# Patient Record
Sex: MALE | Race: WHITE | NOT HISPANIC OR LATINO | Employment: UNEMPLOYED | URBAN - METROPOLITAN AREA
[De-identification: names, ages, dates, MRNs, and addresses within clinical notes are randomized per-mention and may not be internally consistent; named-entity substitution may affect disease eponyms.]

---

## 2017-01-19 ENCOUNTER — TRANSCRIBE ORDERS (OUTPATIENT)
Dept: ADMINISTRATIVE | Facility: HOSPITAL | Age: 57
End: 2017-01-19

## 2017-01-19 DIAGNOSIS — C67.2 MALIGNANT NEOPLASM OF LATERAL WALL OF URINARY BLADDER (HCC): Primary | ICD-10-CM

## 2017-01-20 ENCOUNTER — APPOINTMENT (OUTPATIENT)
Dept: LAB | Facility: CLINIC | Age: 57
End: 2017-01-20
Payer: COMMERCIAL

## 2017-01-20 ENCOUNTER — TRANSCRIBE ORDERS (OUTPATIENT)
Dept: ADMINISTRATIVE | Facility: HOSPITAL | Age: 57
End: 2017-01-20

## 2017-01-20 DIAGNOSIS — C67.2 MALIGNANT NEOPLASM OF LATERAL WALL OF URINARY BLADDER (HCC): ICD-10-CM

## 2017-01-20 DIAGNOSIS — C67.2 MALIGNANT NEOPLASM OF LATERAL WALL OF URINARY BLADDER (HCC): Primary | ICD-10-CM

## 2017-01-20 LAB
ALBUMIN SERPL BCP-MCNC: 4.2 G/DL (ref 3.5–5)
ALP SERPL-CCNC: 100 U/L (ref 46–116)
ALT SERPL W P-5'-P-CCNC: 26 U/L (ref 12–78)
ANION GAP SERPL CALCULATED.3IONS-SCNC: 6 MMOL/L (ref 4–13)
AST SERPL W P-5'-P-CCNC: 15 U/L (ref 5–45)
BILIRUB SERPL-MCNC: 0.52 MG/DL (ref 0.2–1)
BUN SERPL-MCNC: 10 MG/DL (ref 5–25)
CALCIUM SERPL-MCNC: 8.7 MG/DL (ref 8.3–10.1)
CHLORIDE SERPL-SCNC: 100 MMOL/L (ref 100–108)
CO2 SERPL-SCNC: 29 MMOL/L (ref 21–32)
CREAT SERPL-MCNC: 1.01 MG/DL (ref 0.6–1.3)
ERYTHROCYTE [DISTWIDTH] IN BLOOD BY AUTOMATED COUNT: 13.3 % (ref 11.6–15.1)
GFR SERPL CREATININE-BSD FRML MDRD: >60 ML/MIN/1.73SQ M
GLUCOSE SERPL-MCNC: 117 MG/DL (ref 65–140)
HCT VFR BLD AUTO: 41.2 % (ref 36.5–49.3)
HGB BLD-MCNC: 14.5 G/DL (ref 12–17)
MCH RBC QN AUTO: 32.1 PG (ref 26.8–34.3)
MCHC RBC AUTO-ENTMCNC: 35.2 G/DL (ref 31.4–37.4)
MCV RBC AUTO: 91 FL (ref 82–98)
PLATELET # BLD AUTO: 176 THOUSANDS/UL (ref 149–390)
PMV BLD AUTO: 8.9 FL (ref 8.9–12.7)
POTASSIUM SERPL-SCNC: 3.6 MMOL/L (ref 3.5–5.3)
PROT SERPL-MCNC: 7.9 G/DL (ref 6.4–8.2)
RBC # BLD AUTO: 4.52 MILLION/UL (ref 3.88–5.62)
SODIUM SERPL-SCNC: 135 MMOL/L (ref 136–145)
WBC # BLD AUTO: 6.77 THOUSAND/UL (ref 4.31–10.16)

## 2017-01-20 PROCEDURE — 80053 COMPREHEN METABOLIC PANEL: CPT

## 2017-01-20 PROCEDURE — 85027 COMPLETE CBC AUTOMATED: CPT

## 2017-01-20 PROCEDURE — 36415 COLL VENOUS BLD VENIPUNCTURE: CPT

## 2017-01-25 ENCOUNTER — HOSPITAL ENCOUNTER (OUTPATIENT)
Dept: CT IMAGING | Facility: HOSPITAL | Age: 57
Discharge: HOME | End: 2017-01-25
Payer: COMMERCIAL

## 2017-01-25 DIAGNOSIS — C67.2 MALIGNANT NEOPLASM OF LATERAL WALL OF URINARY BLADDER (HCC): ICD-10-CM

## 2017-01-25 PROCEDURE — 74178 CT ABD&PLV WO CNTR FLWD CNTR: CPT

## 2017-01-25 RX ADMIN — IOHEXOL 100 ML: 350 INJECTION, SOLUTION INTRAVENOUS at 07:34

## 2017-07-20 ENCOUNTER — GENERIC CONVERSION - ENCOUNTER (OUTPATIENT)
Dept: OTHER | Facility: OTHER | Age: 57
End: 2017-07-20

## 2017-07-20 LAB
BILIRUB UR QL STRIP: NEGATIVE
CLARITY UR: NORMAL
COLOR UR: YELLOW
GLUCOSE (HISTORICAL): NEGATIVE
HGB UR QL STRIP.AUTO: NORMAL
KETONES UR STRIP-MCNC: NEGATIVE MG/DL
LEUKOCYTE ESTERASE UR QL STRIP: NORMAL
NITRITE UR QL STRIP: NEGATIVE
PH UR STRIP.AUTO: 7 [PH]
PROT UR STRIP-MCNC: NEGATIVE MG/DL
SP GR UR STRIP.AUTO: 1.01
UROBILINOGEN UR QL STRIP.AUTO: 0.2

## 2018-01-12 VITALS
SYSTOLIC BLOOD PRESSURE: 164 MMHG | BODY MASS INDEX: 28.1 KG/M2 | WEIGHT: 212 LBS | DIASTOLIC BLOOD PRESSURE: 90 MMHG | HEIGHT: 73 IN

## 2018-03-06 ENCOUNTER — TRANSCRIBE ORDERS (OUTPATIENT)
Dept: LAB | Facility: CLINIC | Age: 58
End: 2018-03-06

## 2018-03-06 ENCOUNTER — APPOINTMENT (OUTPATIENT)
Dept: LAB | Facility: CLINIC | Age: 58
End: 2018-03-06
Payer: COMMERCIAL

## 2018-03-06 DIAGNOSIS — M19.90 SENILE ARTHRITIS: Primary | ICD-10-CM

## 2018-03-06 DIAGNOSIS — M19.90 SENILE ARTHRITIS: ICD-10-CM

## 2018-03-06 LAB
CRP SERPL QL: 3.2 MG/L
ERYTHROCYTE [SEDIMENTATION RATE] IN BLOOD: 14 MM/HOUR (ref 0–10)

## 2018-03-06 PROCEDURE — 86038 ANTINUCLEAR ANTIBODIES: CPT

## 2018-03-06 PROCEDURE — 86430 RHEUMATOID FACTOR TEST QUAL: CPT

## 2018-03-06 PROCEDURE — 86140 C-REACTIVE PROTEIN: CPT

## 2018-03-06 PROCEDURE — 36415 COLL VENOUS BLD VENIPUNCTURE: CPT

## 2018-03-06 PROCEDURE — 84165 PROTEIN E-PHORESIS SERUM: CPT

## 2018-03-06 PROCEDURE — 86617 LYME DISEASE ANTIBODY: CPT

## 2018-03-06 PROCEDURE — 85652 RBC SED RATE AUTOMATED: CPT

## 2018-03-06 PROCEDURE — 86200 CCP ANTIBODY: CPT

## 2018-03-07 LAB
B BURGDOR IGG PATRN SER IB-IMP: NEGATIVE
B BURGDOR IGM PATRN SER IB-IMP: NEGATIVE
B BURGDOR18KD IGG SER QL IB: ABNORMAL
B BURGDOR23KD IGG SER QL IB: ABNORMAL
B BURGDOR23KD IGM SER QL IB: ABNORMAL
B BURGDOR28KD IGG SER QL IB: ABNORMAL
B BURGDOR30KD IGG SER QL IB: PRESENT
B BURGDOR39KD IGG SER QL IB: ABNORMAL
B BURGDOR39KD IGM SER QL IB: ABNORMAL
B BURGDOR41KD IGG SER QL IB: ABNORMAL
B BURGDOR41KD IGM SER QL IB: ABNORMAL
B BURGDOR45KD IGG SER QL IB: ABNORMAL
B BURGDOR58KD IGG SER QL IB: ABNORMAL
B BURGDOR66KD IGG SER QL IB: ABNORMAL
B BURGDOR93KD IGG SER QL IB: ABNORMAL
RHEUMATOID FACT SER QL LA: NEGATIVE
RYE IGE QN: NEGATIVE

## 2018-03-08 LAB
ALBUMIN SERPL ELPH-MCNC: 4.7 G/DL (ref 3.5–5)
ALBUMIN SERPL ELPH-MCNC: 61.1 % (ref 52–65)
ALPHA1 GLOB SERPL ELPH-MCNC: 0.36 G/DL (ref 0.1–0.4)
ALPHA1 GLOB SERPL ELPH-MCNC: 4.7 % (ref 2.5–5)
ALPHA2 GLOB SERPL ELPH-MCNC: 0.76 G/DL (ref 0.4–1.2)
ALPHA2 GLOB SERPL ELPH-MCNC: 9.9 % (ref 7–13)
BETA GLOB ABNORMAL SERPL ELPH-MCNC: 0.45 G/DL (ref 0.4–0.8)
BETA1 GLOB SERPL ELPH-MCNC: 5.8 % (ref 5–13)
BETA2 GLOB SERPL ELPH-MCNC: 6.4 % (ref 2–8)
BETA2+GAMMA GLOB SERPL ELPH-MCNC: 0.49 G/DL (ref 0.2–0.5)
GAMMA GLOB ABNORMAL SERPL ELPH-MCNC: 0.93 G/DL (ref 0.5–1.6)
GAMMA GLOB SERPL ELPH-MCNC: 12.1 % (ref 12–22)
IGG/ALB SER: 1.57 {RATIO} (ref 1.1–1.8)
PROT PATTERN SERPL ELPH-IMP: NORMAL
PROT SERPL-MCNC: 7.7 G/DL (ref 6.4–8.2)

## 2018-03-09 LAB — CCP IGA+IGG SERPL IA-ACNC: 5 UNITS (ref 0–19)

## 2018-07-19 RX ORDER — AMLODIPINE BESYLATE 10 MG/1
1 TABLET ORAL
COMMUNITY

## 2018-07-20 ENCOUNTER — PROCEDURE VISIT (OUTPATIENT)
Dept: UROLOGY | Facility: MEDICAL CENTER | Age: 58
End: 2018-07-20
Payer: COMMERCIAL

## 2018-07-20 VITALS
SYSTOLIC BLOOD PRESSURE: 130 MMHG | HEIGHT: 74 IN | WEIGHT: 220 LBS | BODY MASS INDEX: 28.23 KG/M2 | DIASTOLIC BLOOD PRESSURE: 82 MMHG

## 2018-07-20 DIAGNOSIS — Z12.5 ENCOUNTER FOR PROSTATE CANCER SCREENING: ICD-10-CM

## 2018-07-20 DIAGNOSIS — C67.2 MALIGNANT NEOPLASM OF LATERAL WALL OF URINARY BLADDER (HCC): Primary | ICD-10-CM

## 2018-07-20 LAB
SL AMB  POCT GLUCOSE, UA: ABNORMAL
SL AMB LEUKOCYTE ESTERASE,UA: ABNORMAL
SL AMB POCT BILIRUBIN,UA: ABNORMAL
SL AMB POCT BLOOD,UA: ABNORMAL
SL AMB POCT CLARITY,UA: CLEAR
SL AMB POCT COLOR,UA: YELLOW
SL AMB POCT KETONES,UA: ABNORMAL
SL AMB POCT NITRITE,UA: ABNORMAL
SL AMB POCT PH,UA: 6.5
SL AMB POCT SPECIFIC GRAVITY,UA: 1.01
SL AMB POCT URINE PROTEIN: ABNORMAL
SL AMB POCT UROBILINOGEN: 0.2

## 2018-07-20 PROCEDURE — 81003 URINALYSIS AUTO W/O SCOPE: CPT | Performed by: UROLOGY

## 2018-07-20 PROCEDURE — 99212 OFFICE O/P EST SF 10 MIN: CPT | Performed by: UROLOGY

## 2018-07-20 RX ORDER — LISINOPRIL 10 MG/1
10 TABLET ORAL DAILY
COMMUNITY

## 2018-07-20 RX ORDER — CIPROFLOXACIN 500 MG/1
500 TABLET, FILM COATED ORAL ONCE
Qty: 1 TABLET | Refills: 0 | Status: SHIPPED | OUTPATIENT
Start: 2018-07-20 | End: 2018-07-20

## 2018-07-20 NOTE — PROGRESS NOTES
[address] [facility]  [facility]  [address] [facility], [address] [place] [address]  [phone]  www  [facility]  org      NAME: Odin Carter  AGE: 59 y o  SEX: male  : 1960   MRN: [id number]    DATE: 2018  TIME: 9:59 AM    Assessment and Plan: Three of status post bladder cancer-N ED  Follow-up 1 year with cystoscopy  Encounter for prostate cancer screening:  Check PSA, send results to patient  One pill of Cipro called the patient's pharmacy for to cover the procedure  Chief Complaint     Chief Complaint   Patient presents with    Cystoscopy       History of Present Illness     TURBT 2015, low grade papillary TCC, followed by BCG  Had 5cm initial tumor, which was reason for adjuvant tx  Here for surveillance cysto  Doing well, no complaints  Cystoscopy Procedure Note        Pre-operative Diagnosis:  Bladder cancer    Post-operative Diagnosis:  Bladder cancer no recurrence    Procedure: Flexible cystoscopy    Surgeon: Ginny Luna MD    Anesthesia: 1% Xylocaine per urethra    EBL: Minimal    Complications: none    Procedure Details   The risks, benefits, complications, treatment options, and expected outcomes were discussed with the patient  The patient concurred with the proposed plan, giving informed consent  Cystoscopy was performed today under local anesthesia, using sterile technique  The patient was placed in the supine position, prepped with Betadine, and draped in the usual sterile fashion  The flexible cystocope was used to inspect both the urethra and bladder    Findings:  Urethra:  Normal without stricture  Minimal prostatic obstruction    Bladder:  Smooth, not trabeculated and there were no stones tumors or other lesions  The orifices were orthotopic and intact  Stellate scar in the left lateral trigone, no recurrence             Specimens:  None                 Complications:  None           Disposition: To home Condition:  Stable        The following portions of the patient's history were reviewed and updated as appropriate: allergies, current medications, past family history, past medical history, past social history, past surgical history and problem list     Review of Systems   Review of Systems    Active Problem List   There is no problem list on file for this patient        Objective   /82   Ht 6' 2" (1 88 m)   Wt 99 8 kg (220 lb)   BMI 28 25 kg/m²     Physical Exam        Current Medications     Current Outpatient Prescriptions:     amLODIPine (NORVASC) 10 mg tablet, Take 1 tablet by mouth, Disp: , Rfl:     lisinopril (ZESTRIL) 10 mg tablet, Take 10 mg by mouth daily, Disp: , Rfl:         Donald Camp MD

## 2018-07-20 NOTE — LETTER
2018     Dominique Bettencourt DO  [address] [facility] [address]    Patient: Truman Gant   YOB: 1960   Date of Visit: 2018       Dear Dr Vic Sullivan:    Thank you for referring Truman Gant to me for evaluation  Below are my notes for this consultation  If you have questions, please do not hesitate to call me  I look forward to following your patient along with you  Sincerely,        Leif Pierce MD        CC: No Recipients  Leif Pierce MD  2018 10:15 AM  Sign at close encounter  [address] [facility]  [address], [address] [place] [address]  [phone]  www  [facility]  org      NAME: Truman Gant  AGE: 59 y o  SEX: male  : 1960   MRN: [id number]    DATE: 2018  TIME: 9:59 AM    Assessment and Plan: Three of status post bladder cancer-N ED  Follow-up 1 year with cystoscopy  Encounter for prostate cancer screening:  Check PSA, send results to patient  One pill of Cipro called the patient's pharmacy for to cover the procedure  Chief Complaint     Chief Complaint   Patient presents with    Cystoscopy       History of Present Illness     TURBT 2015, low grade papillary TCC, followed by BCG  Had 5cm initial tumor, which was reason for adjuvant tx  Here for surveillance cysto  Doing well, no complaints  Cystoscopy Procedure Note        Pre-operative Diagnosis:  Bladder cancer    Post-operative Diagnosis:  Bladder cancer no recurrence    Procedure: Flexible cystoscopy    Surgeon: Narinder Lester MD    Anesthesia: 1% Xylocaine per urethra    EBL: Minimal    Complications: none    Procedure Details   The risks, benefits, complications, treatment options, and expected outcomes were discussed with the patient  The patient concurred with the proposed plan, giving informed consent  Cystoscopy was performed today under local anesthesia, using sterile technique   The patient was placed in the supine position, prepped with Betadine, and draped in the usual sterile fashion  The flexible cystocope was used to inspect both the urethra and bladder    Findings:  Urethra:  Normal without stricture  Minimal prostatic obstruction    Bladder:  Smooth, not trabeculated and there were no stones tumors or other lesions  The orifices were orthotopic and intact  Stellate scar in the left lateral trigone, no recurrence  Specimens:  None                 Complications:  None           Disposition: To home            Condition:  Stable        The following portions of the patient's history were reviewed and updated as appropriate: allergies, current medications, past family history, past medical history, past social history, past surgical history and problem list     Review of Systems   Review of Systems    Active Problem List   There is no problem list on file for this patient        Objective   /82   Ht 6' 2" (1 88 m)   Wt 99 8 kg (220 lb)   BMI 28 25 kg/m²      Physical Exam        Current Medications     Current Outpatient Prescriptions:     amLODIPine (NORVASC) 10 mg tablet, Take 1 tablet by mouth, Disp: , Rfl:     lisinopril (ZESTRIL) 10 mg tablet, Take 10 mg by mouth daily, Disp: , Rfl:         Tom Mendoza MD

## 2018-07-24 ENCOUNTER — LAB (OUTPATIENT)
Dept: LAB | Facility: CLINIC | Age: 58
End: 2018-07-24
Payer: COMMERCIAL

## 2018-07-24 DIAGNOSIS — Z12.5 ENCOUNTER FOR PROSTATE CANCER SCREENING: ICD-10-CM

## 2018-07-24 LAB — PSA SERPL-MCNC: 0.6 NG/ML (ref 0–4)

## 2018-07-24 PROCEDURE — 36415 COLL VENOUS BLD VENIPUNCTURE: CPT

## 2018-07-24 PROCEDURE — G0103 PSA SCREENING: HCPCS

## 2018-08-30 ENCOUNTER — OFFICE VISIT (OUTPATIENT)
Dept: URGENT CARE | Facility: CLINIC | Age: 58
End: 2018-08-30
Payer: COMMERCIAL

## 2018-08-30 VITALS
HEIGHT: 74 IN | WEIGHT: 225 LBS | RESPIRATION RATE: 20 BRPM | DIASTOLIC BLOOD PRESSURE: 90 MMHG | TEMPERATURE: 98.3 F | OXYGEN SATURATION: 98 % | SYSTOLIC BLOOD PRESSURE: 152 MMHG | HEART RATE: 78 BPM | BODY MASS INDEX: 28.88 KG/M2

## 2018-08-30 DIAGNOSIS — H57.12 LEFT EYE PAIN: Primary | ICD-10-CM

## 2018-08-30 DIAGNOSIS — H53.8 BLURRY VISION, LEFT EYE: ICD-10-CM

## 2018-08-30 PROCEDURE — S9088 SERVICES PROVIDED IN URGENT: HCPCS | Performed by: PHYSICIAN ASSISTANT

## 2018-08-30 PROCEDURE — 99213 OFFICE O/P EST LOW 20 MIN: CPT | Performed by: PHYSICIAN ASSISTANT

## 2018-08-30 NOTE — PROGRESS NOTES
[address] [facility]  (office) [phone]  (fax) [phone]        NAME: Maria M Beverly is a 59 y o  male  : 1960    MRN: [id number]  DATE: 2018  TIME: 10:27 AM    Assessment and Plan   Left eye pain [H57 12]  1  Left eye pain  Ambulatory referral to Ophthalmology   2  Blurry vision, left eye         Patient Instructions   Called H[facility] Villafana and Boyd Box and patient is to proceed there now for further evaluation  To present to the ER if symptoms worsen  Chief Complaint     Chief Complaint   Patient presents with    Eye Pain     Woke up this am and has visual disturbances left eye, states it is like looking through "dirty windshield"         History of Present Illness   Maria M Beverly presents to the clinic c/o    Patient reports he woke up this morning and feels like his left eye "is looking out of a dirty windshield"  He reports floaters in bilateral eyes as well  He reports he has a known cataract on his right eye  Has not seen an eye doctor in around 2 years  Tried to get in with his eye doctor today but they do not accept his insurance  Feels healthy otherwise, no other complaints  Eye Pain    The left eye is affected  This is a new problem  The current episode started today  The problem occurs constantly  The problem has been unchanged  There was no injury mechanism  The pain is moderate  There is no known exposure to pink eye  He does not wear contacts  Associated symptoms include blurred vision  Pertinent negatives include no eye discharge, double vision, eye redness, fever, foreign body sensation, itching, nausea, photophobia, recent URI or vomiting  He has tried nothing for the symptoms  The treatment provided no relief  Review of Systems   Review of Systems   Constitutional: Negative for activity change, appetite change, chills, diaphoresis, fatigue and fever     HENT: Negative for congestion, ear discharge, ear pain, facial swelling, rhinorrhea, sinus pain, sinus pressure, sneezing and sore throat  Eyes: Positive for blurred vision, pain and visual disturbance  Negative for double vision, photophobia, discharge, redness and itching  Respiratory: Negative for apnea, cough, chest tightness, shortness of breath and wheezing  Cardiovascular: Negative for chest pain  Gastrointestinal: Negative for abdominal distention, abdominal pain, anal bleeding, blood in stool, constipation, diarrhea, nausea and vomiting  Genitourinary: Negative for dysuria, flank pain, frequency, hematuria and urgency  Musculoskeletal: Negative for arthralgias, back pain, gait problem, joint swelling, myalgias, neck pain and neck stiffness  Skin: Negative for color change, rash and wound  Allergic/Immunologic: Negative for immunocompromised state  Neurological: Negative for dizziness, facial asymmetry and headaches  Hematological: Negative for adenopathy  Psychiatric/Behavioral: Negative for confusion and decreased concentration           Current Medications     Long-Term Prescriptions   Medication Sig Dispense Refill    amLODIPine (NORVASC) 10 mg tablet Take 1 tablet by mouth      lisinopril (ZESTRIL) 10 mg tablet Take 10 mg by mouth daily         Current Allergies     Allergies as of 08/30/2018 - Reviewed 08/30/2018   Allergen Reaction Noted    Penicillins  07/20/2017            The following portions of the patient's history were reviewed and updated as appropriate: allergies, current medications, past family history, past medical history, past social history, past surgical history and problem list   Past Medical History:   Diagnosis Date    Cancer of lateral wall of urinary bladder ([address] )     Cataract, right eye     Edema     Hematuria, gross     Hypertension      Past Surgical History:   Procedure Laterality Date    BLADDER INSTILLATION  2015    BCG  X 6    CYSTOSCOPY  09/30/2015    W/ Irrigation of clots    TRANSURETHRAL RESECTION OF BLADDER TUMOR  09/2015     Social History     Social History    Marital status:      Spouse name: N/A    Number of children: N/A    Years of education: N/A     Occupational History    Not on file  Social History Main Topics    Smoking status: Former Smoker     Packs/day: 2 00    Smokeless tobacco: Not on file    Alcohol use No    Drug use: No    Sexual activity: Not on file     Other Topics Concern    Not on file     Social History Narrative    No narrative on file       Objective   /90   Pulse 78   Temp 98 3 °F (36 8 °C) (Tympanic)   Resp 20   Ht 6' 2" (1 88 m)   Wt 102 kg (225 lb)   SpO2 98%   BMI 28 89 kg/m²      Physical Exam     Physical Exam   Constitutional: He is oriented to person, place, and time  He appears well-developed and well-nourished  No distress  HENT:   Head: Normocephalic and atraumatic  Right Ear: Tympanic membrane and external ear normal    Left Ear: Tympanic membrane and external ear normal    Nose: Nose normal    Mouth/Throat: Oropharynx is clear and moist  No oropharyngeal exudate  Eyes: Conjunctivae and EOM are normal  Pupils are equal, round, and reactive to light  Right eye exhibits no discharge  Left eye exhibits no discharge  Right conjunctiva is not injected  Right conjunctiva has no hemorrhage  Left conjunctiva is not injected  Left conjunctiva has no hemorrhage  No scleral icterus  Right eye exhibits normal extraocular motion and no nystagmus  Left eye exhibits normal extraocular motion and no nystagmus  Pupils are equal    Fundoscopic exam:       The right eye shows no exudate  The left eye shows no exudate  Neck: Normal range of motion  Neck supple  No JVD present  No tracheal deviation present  No thyromegaly present  Cardiovascular: Normal rate, regular rhythm and normal heart sounds  Exam reveals no gallop and no friction rub  No murmur heard  Pulmonary/Chest: Effort normal and breath sounds normal  No stridor   No respiratory distress  He has no wheezes  He has no rales  He exhibits no tenderness  Abdominal: Soft  Bowel sounds are normal  He exhibits no distension and no mass  There is no tenderness  There is no rebound and no guarding  Musculoskeletal: Normal range of motion  He exhibits no tenderness or deformity  Lymphadenopathy:     He has no cervical adenopathy  Neurological: He is alert and oriented to person, place, and time  He has normal reflexes  Coordination normal    Skin: Skin is warm and dry  No rash noted  He is not diaphoretic  No erythema  No pallor  Psychiatric: He has a normal mood and affect  His behavior is normal  Judgment and thought content normal    Nursing note and vitals reviewed        Gal Salgado PA-C

## 2019-07-26 ENCOUNTER — PROCEDURE VISIT (OUTPATIENT)
Dept: UROLOGY | Facility: MEDICAL CENTER | Age: 59
End: 2019-07-26
Payer: COMMERCIAL

## 2019-07-26 ENCOUNTER — APPOINTMENT (OUTPATIENT)
Dept: LAB | Facility: CLINIC | Age: 59
End: 2019-07-26
Payer: COMMERCIAL

## 2019-07-26 VITALS — BODY MASS INDEX: 30.48 KG/M2 | WEIGHT: 230 LBS | HEIGHT: 73 IN

## 2019-07-26 DIAGNOSIS — Z12.5 ENCOUNTER FOR PROSTATE CANCER SCREENING: ICD-10-CM

## 2019-07-26 DIAGNOSIS — C67.2 MALIGNANT NEOPLASM OF LATERAL WALL OF URINARY BLADDER (HCC): Primary | ICD-10-CM

## 2019-07-26 DIAGNOSIS — C67.2 MALIGNANT NEOPLASM OF LATERAL WALL OF URINARY BLADDER (HCC): ICD-10-CM

## 2019-07-26 DIAGNOSIS — C67.4 MALIGNANT NEOPLASM OF POSTERIOR WALL OF URINARY BLADDER (HCC): ICD-10-CM

## 2019-07-26 LAB
ANION GAP SERPL CALCULATED.3IONS-SCNC: 4 MMOL/L (ref 4–13)
BUN SERPL-MCNC: 7 MG/DL (ref 5–25)
CALCIUM SERPL-MCNC: 9.2 MG/DL (ref 8.3–10.1)
CHLORIDE SERPL-SCNC: 103 MMOL/L (ref 100–108)
CO2 SERPL-SCNC: 27 MMOL/L (ref 21–32)
CREAT SERPL-MCNC: 1.19 MG/DL (ref 0.6–1.3)
GFR SERPL CREATININE-BSD FRML MDRD: 66 ML/MIN/1.73SQ M
GLUCOSE P FAST SERPL-MCNC: 109 MG/DL (ref 65–99)
POTASSIUM SERPL-SCNC: 4.2 MMOL/L (ref 3.5–5.3)
PSA SERPL-MCNC: 0.8 NG/ML (ref 0–4)
SL AMB  POCT GLUCOSE, UA: ABNORMAL
SL AMB LEUKOCYTE ESTERASE,UA: ABNORMAL
SL AMB POCT BILIRUBIN,UA: ABNORMAL
SL AMB POCT BLOOD,UA: ABNORMAL
SL AMB POCT CLARITY,UA: CLEAR
SL AMB POCT COLOR,UA: YELLOW
SL AMB POCT KETONES,UA: ABNORMAL
SL AMB POCT NITRITE,UA: ABNORMAL
SL AMB POCT PH,UA: 6.5
SL AMB POCT SPECIFIC GRAVITY,UA: 1.01
SL AMB POCT URINE PROTEIN: ABNORMAL
SL AMB POCT UROBILINOGEN: 0.2
SODIUM SERPL-SCNC: 134 MMOL/L (ref 136–145)

## 2019-07-26 PROCEDURE — 36415 COLL VENOUS BLD VENIPUNCTURE: CPT

## 2019-07-26 PROCEDURE — 80048 BASIC METABOLIC PNL TOTAL CA: CPT

## 2019-07-26 PROCEDURE — G0103 PSA SCREENING: HCPCS

## 2019-07-26 PROCEDURE — 81003 URINALYSIS AUTO W/O SCOPE: CPT | Performed by: UROLOGY

## 2019-07-26 PROCEDURE — 99214 OFFICE O/P EST MOD 30 MIN: CPT | Performed by: UROLOGY

## 2019-07-26 PROCEDURE — 87086 URINE CULTURE/COLONY COUNT: CPT | Performed by: UROLOGY

## 2019-07-26 PROCEDURE — 52000 CYSTOURETHROSCOPY: CPT | Performed by: UROLOGY

## 2019-07-26 RX ORDER — CIPROFLOXACIN 500 MG/1
500 TABLET, FILM COATED ORAL ONCE
Qty: 1 TABLET | Refills: 0 | Status: SHIPPED | OUTPATIENT
Start: 2019-07-26 | End: 2019-07-26

## 2019-07-26 RX ORDER — LEVOFLOXACIN 5 MG/ML
750 INJECTION, SOLUTION INTRAVENOUS ONCE
Status: CANCELLED | OUTPATIENT
Start: 2019-08-07 | End: 2019-07-26

## 2019-07-26 NOTE — LETTER
2019     Fredy PintoDO michelle  1978 [address]    Patient: Venkat Javier   YOB: 1960   Date of Visit: 2019       Dear Dr Mele Frye:    Thank you for referring Venkat Javier to me for evaluation  Below are my notes for this consultation  If you have questions, please do not hesitate to call me  I look forward to following your patient along with you  Sincerely,        Eboni Granados MD        CC: No Recipients  Eboni Granados MD  2019  9:44 AM  Sign at close encounter  [address] [facility]  [address], [address] [place] [address]  [phone]  www  [facility]  org      NAME: Venkat Javier  AGE: 62 y o  SEX: male  : 1960   MRN: [id number]    DATE: 2019  TIME: 8:42 AM    Assessment and Plan:    Bladder cancer, with minor recurrence 1 cm tumor left posterior wall  Plan cystoscopy, TURBT and mitomycin installation  The risks of bleeding infection and damage to the bladder explained he gives informed consent  His previous tumor was 5 cm  Chief Complaint   No chief complaint on file  History of Present Illness   Bladder cancer:  Status post TURBT 2015, low-grade papillary TCC followed by BCG  He had 5 cm initial tumor which was reason for adjuvant treatment  He is here for his 4 years surveillance cystoscopy  Encounter for prostate cancer screening:  No recent PSA  Last PSA was 0 6 2018      Cystoscopy  Date/Time: 2019 8:44 AM  Performed by: Eboni Granados MD  Authorized by: Eboni Granados MD     Procedure details: cystoscopy    Additional Procedure Details: Cystoscopy Procedure Note        Pre-operative Diagnosis:  Bladder cancer status post TURBT , here for surveillance    Post-operative Diagnosis:  Same    Procedure: Flexible cystoscopy    Surgeon: Clarisse Sterling MD    Anesthesia: 1% Xylocaine per urethra    EBL: Minimal    Complications: none    Procedure Details   The risks, benefits, complications, treatment options, and expected outcomes were discussed with the patient  The patient concurred with the proposed plan, giving informed consent  Cystoscopy was performed today under local anesthesia, using sterile technique  The patient was placed in the supine position, prepped with Betadine, and draped in the usual sterile fashion  The flexible cystocope was used to inspect both the urethra and bladder    Findings:  Urethra:  Normal without stricture  Moderate prostatic occlusion  Bladder:  Smooth, not trabeculated and there is a 1 cm recurrence left posterior wall     The orifices were orthotopic and intact  Specimens:  None                 Complications:  None           Disposition: To home            Condition:  Stable          The following portions of the patient's history were reviewed and updated as appropriate: allergies, current medications, past family history, past medical history, past social history, past surgical history and problem list   Past Medical History:   Diagnosis Date    Cancer of lateral wall of urinary bladder ([address] )     Cataract, right eye     Edema     Hematuria, gross     Hypertension      Past Surgical History:   Procedure Laterality Date    BLADDER INSTILLATION  2015    BCG  X 6    CYSTOSCOPY  09/30/2015    W/ Irrigation of clots    TRANSURETHRAL RESECTION OF BLADDER TUMOR  09/2015     shoulder  Review of Systems   Review of Systems    Active Problem List   There is no problem list on file for this patient  Objective   There were no vitals taken for this visit  Physical Exam   Constitutional: He is oriented to person, place, and time  He appears well-developed and well-nourished  HENT:   Head: Normocephalic and atraumatic  Eyes: EOM are normal    Neck: Normal range of motion  Cardiovascular: Regular rhythm and normal heart sounds  Exam reveals no friction rub     No murmur heard  Tachycardic   Pulmonary/Chest: Effort normal and breath sounds normal    Abdominal: Soft  Genitourinary: Penis normal    Musculoskeletal: Normal range of motion  Neurological: He is alert and oriented to person, place, and time  Skin: Skin is warm and dry  Psychiatric: He has a normal mood and affect   His behavior is normal  Judgment and thought content normal            Current Medications     Current Outpatient Medications:     amLODIPine (NORVASC) 10 mg tablet, Take 1 tablet by mouth, Disp: , Rfl:     lisinopril (ZESTRIL) 10 mg tablet, Take 10 mg by mouth daily, Disp: , Rfl:         Allison Clayton MD

## 2019-07-26 NOTE — H&P (VIEW-ONLY)
[address] [facility]  [facility]  [address] [facility], [address] [place] [address]  [phone]  www  [facility]  org      NAME: Dimitri Peña  AGE: 62 y o  SEX: male  : 1960   MRN: [id number]    DATE: 2019  TIME: 8:42 AM    Assessment and Plan:    Bladder cancer, with minor recurrence 1 cm tumor left posterior wall  Plan cystoscopy, TURBT and mitomycin installation  The risks of bleeding infection and damage to the bladder explained he gives informed consent  His previous tumor was 5 cm  Chief Complaint   No chief complaint on file  History of Present Illness   Bladder cancer:  Status post TURBT 2015, low-grade papillary TCC followed by BCG  He had 5 cm initial tumor which was reason for adjuvant treatment  He is here for his 4 years surveillance cystoscopy  Encounter for prostate cancer screening:  No recent PSA  Last PSA was 0 6 2018  Cystoscopy  Date/Time: 2019 8:44 AM  Performed by: Jem Mora MD  Authorized by: Jem Mora MD     Procedure details: cystoscopy    Additional Procedure Details: Cystoscopy Procedure Note        Pre-operative Diagnosis:  Bladder cancer status post TURBT , here for surveillance    Post-operative Diagnosis:  Same    Procedure: Flexible cystoscopy    Surgeon: Patricia Wong MD    Anesthesia: 1% Xylocaine per urethra    EBL: Minimal    Complications: none    Procedure Details   The risks, benefits, complications, treatment options, and expected outcomes were discussed with the patient  The patient concurred with the proposed plan, giving informed consent  Cystoscopy was performed today under local anesthesia, using sterile technique  The patient was placed in the supine position, prepped with Betadine, and draped in the usual sterile fashion  The flexible cystocope was used to inspect both the urethra and bladder    Findings:  Urethra:  Normal without stricture    Moderate prostatic occlusion  Bladder:  Smooth, not trabeculated and there is a 1 cm recurrence left posterior wall     The orifices were orthotopic and intact  Specimens:  None                 Complications:  None           Disposition: To home            Condition:  Stable          The following portions of the patient's history were reviewed and updated as appropriate: allergies, current medications, past family history, past medical history, past social history, past surgical history and problem list   Past Medical History:   Diagnosis Date    Cancer of lateral wall of urinary bladder ([address] )     Cataract, right eye     Edema     Hematuria, gross     Hypertension      Past Surgical History:   Procedure Laterality Date    BLADDER INSTILLATION  2015    BCG  X 6    CYSTOSCOPY  09/30/2015    W/ Irrigation of clots    TRANSURETHRAL RESECTION OF BLADDER TUMOR  09/2015     shoulder  Review of Systems   Review of Systems    Active Problem List   There is no problem list on file for this patient  Objective   There were no vitals taken for this visit  Physical Exam   Constitutional: He is oriented to person, place, and time  He appears well-developed and well-nourished  HENT:   Head: Normocephalic and atraumatic  Eyes: EOM are normal    Neck: Normal range of motion  Cardiovascular: Regular rhythm and normal heart sounds  Exam reveals no friction rub  No murmur heard  Tachycardic   Pulmonary/Chest: Effort normal and breath sounds normal    Abdominal: Soft  Genitourinary: Penis normal    Musculoskeletal: Normal range of motion  Neurological: He is alert and oriented to person, place, and time  Skin: Skin is warm and dry  Psychiatric: He has a normal mood and affect   His behavior is normal  Judgment and thought content normal            Current Medications     Current Outpatient Medications:     amLODIPine (NORVASC) 10 mg tablet, Take 1 tablet by mouth, Disp: , Rfl:     lisinopril (ZESTRIL) 10 mg tablet, Take 10 mg by mouth daily, Disp: , Rfl:         Atif Fischer MD

## 2019-07-26 NOTE — H&P
[address] [facility]  [facility]  [address] [facility]  [place], [address] [place] [address]  [phone]  www  [facility]  org      NAME: Dimitri Peña  AGE: 62 y o  SEX: male  : 1960   MRN: [id number]    DATE: 2019  TIME: 8:42 AM    Assessment and Plan:    Bladder cancer, with minor recurrence 1 cm tumor left posterior wall  Plan cystoscopy, TURBT and mitomycin installation  The risks of bleeding infection and damage to the bladder explained he gives informed consent  His previous tumor was 5 cm  Chief Complaint   No chief complaint on file  History of Present Illness   Bladder cancer:  Status post TURBT 2015, low-grade papillary TCC followed by BCG  He had 5 cm initial tumor which was reason for adjuvant treatment  He is here for his 4 years surveillance cystoscopy  Encounter for prostate cancer screening:  No recent PSA  Last PSA was 0 6 2018  Cystoscopy  Date/Time: 2019 8:44 AM  Performed by: Jem Mora MD  Authorized by: Jem Mora MD     Procedure details: cystoscopy    Additional Procedure Details: Cystoscopy Procedure Note        Pre-operative Diagnosis:  Bladder cancer status post TURBT , here for surveillance    Post-operative Diagnosis:  Same    Procedure: Flexible cystoscopy    Surgeon: Patricia Wong MD    Anesthesia: 1% Xylocaine per urethra    EBL: Minimal    Complications: none    Procedure Details   The risks, benefits, complications, treatment options, and expected outcomes were discussed with the patient  The patient concurred with the proposed plan, giving informed consent  Cystoscopy was performed today under local anesthesia, using sterile technique  The patient was placed in the supine position, prepped with Betadine, and draped in the usual sterile fashion  The flexible cystocope was used to inspect both the urethra and bladder    Findings:  Urethra:  Normal without stricture    Moderate prostatic occlusion  Bladder:  Smooth, not trabeculated and there is a 1 cm recurrence left posterior wall     The orifices were orthotopic and intact  Specimens:  None                 Complications:  None           Disposition: To home            Condition:  Stable          The following portions of the patient's history were reviewed and updated as appropriate: allergies, current medications, past family history, past medical history, past social history, past surgical history and problem list   Past Medical History:   Diagnosis Date    Cancer of lateral wall of urinary bladder ([address] )     Cataract, right eye     Edema     Hematuria, gross     Hypertension      Past Surgical History:   Procedure Laterality Date    BLADDER INSTILLATION  2015    BCG  X 6    CYSTOSCOPY  09/30/2015    W/ Irrigation of clots    TRANSURETHRAL RESECTION OF BLADDER TUMOR  09/2015     shoulder  Review of Systems   Review of Systems    Active Problem List   There is no problem list on file for this patient  Objective   There were no vitals taken for this visit  Physical Exam   Constitutional: He is oriented to person, place, and time  He appears well-developed and well-nourished  HENT:   Head: Normocephalic and atraumatic  Eyes: EOM are normal    Neck: Normal range of motion  Cardiovascular: Regular rhythm and normal heart sounds  Exam reveals no friction rub  No murmur heard  Tachycardic   Pulmonary/Chest: Effort normal and breath sounds normal    Abdominal: Soft  Genitourinary: Penis normal    Musculoskeletal: Normal range of motion  Neurological: He is alert and oriented to person, place, and time  Skin: Skin is warm and dry  Psychiatric: He has a normal mood and affect   His behavior is normal  Judgment and thought content normal            Current Medications     Current Outpatient Medications:     amLODIPine (NORVASC) 10 mg tablet, Take 1 tablet by mouth, Disp: , Rfl:     lisinopril (ZESTRIL) 10 mg tablet, Take 10 mg by mouth daily, Disp: , Rfl:         Allison Clayton MD

## 2019-07-27 LAB — BACTERIA UR CULT: NORMAL

## 2019-07-30 RX ORDER — OMEPRAZOLE 20 MG/1
20 TABLET, DELAYED RELEASE ORAL AS NEEDED
COMMUNITY

## 2019-07-30 NOTE — PRE-PROCEDURE INSTRUCTIONS
Pre-Surgery Instructions:   Medication Instructions    amLODIPine (NORVASC) 10 mg tablet Patient was instructed by Physician and understands   lisinopril (ZESTRIL) 10 mg tablet Patient was instructed by Physician and understands   Multiple Vitamins-Minerals (EYE VITAMINS PO) Patient was instructed by Physician and understands   omeprazole (PriLOSEC OTC) 20 MG tablet Patient was instructed by Physician and understands  [facility] preop instructions reviewed with pt  Pt has dial antibacterial soap

## 2019-08-02 ENCOUNTER — TELEPHONE (OUTPATIENT)
Dept: UROLOGY | Facility: MEDICAL CENTER | Age: 59
End: 2019-08-02

## 2019-08-02 ENCOUNTER — TRANSCRIBE ORDERS (OUTPATIENT)
Dept: URGENT CARE | Facility: CLINIC | Age: 59
End: 2019-08-02

## 2019-08-02 ENCOUNTER — CLINICAL SUPPORT (OUTPATIENT)
Dept: URGENT CARE | Facility: CLINIC | Age: 59
End: 2019-08-02
Payer: COMMERCIAL

## 2019-08-02 DIAGNOSIS — C67.4 MALIGNANT NEOPLASM OF POSTERIOR WALL OF URINARY BLADDER (HCC): ICD-10-CM

## 2019-08-02 PROCEDURE — 93005 ELECTROCARDIOGRAM TRACING: CPT

## 2019-08-05 ENCOUNTER — TELEPHONE (OUTPATIENT)
Dept: UROLOGY | Facility: MEDICAL CENTER | Age: 59
End: 2019-08-05

## 2019-08-05 NOTE — TELEPHONE ENCOUNTER
I spoke to [place] regarding patients labs prior to surgery   He will have them done on admit

## 2019-08-05 NOTE — TELEPHONE ENCOUNTER
Patient of Dr Apryl Layne scheduled for surgery on 08/07/19  Gaston Munira from [facility] needs a call back re patient's labs prior to surgery  She can be reached at [phone]  If she's not available, please ask for Rajesh Tavera

## 2019-08-06 ENCOUNTER — ANESTHESIA EVENT (OUTPATIENT)
Dept: PERIOP | Facility: HOSPITAL | Age: 59
End: 2019-08-06
Payer: COMMERCIAL

## 2019-08-06 LAB
ATRIAL RATE: 93 BPM
P AXIS: 32 DEGREES
PR INTERVAL: 172 MS
QRS AXIS: 88 DEGREES
QRSD INTERVAL: 88 MS
QT INTERVAL: 358 MS
QTC INTERVAL: 445 MS
T WAVE AXIS: -12 DEGREES
VENTRICULAR RATE: 93 BPM

## 2019-08-06 PROCEDURE — 93010 ELECTROCARDIOGRAM REPORT: CPT | Performed by: INTERNAL MEDICINE

## 2019-08-07 ENCOUNTER — APPOINTMENT (OUTPATIENT)
Dept: RADIOLOGY | Facility: HOSPITAL | Age: 59
End: 2019-08-07
Payer: COMMERCIAL

## 2019-08-07 ENCOUNTER — HOSPITAL ENCOUNTER (OUTPATIENT)
Facility: HOSPITAL | Age: 59
Setting detail: OUTPATIENT SURGERY
Discharge: HOME | End: 2019-08-07
Attending: UROLOGY | Admitting: UROLOGY
Payer: COMMERCIAL

## 2019-08-07 ENCOUNTER — ANESTHESIA (OUTPATIENT)
Dept: PERIOP | Facility: HOSPITAL | Age: 59
End: 2019-08-07
Payer: COMMERCIAL

## 2019-08-07 VITALS
BODY MASS INDEX: 31.14 KG/M2 | DIASTOLIC BLOOD PRESSURE: 72 MMHG | TEMPERATURE: 97.5 F | RESPIRATION RATE: 18 BRPM | HEIGHT: 73 IN | WEIGHT: 235 LBS | HEART RATE: 103 BPM | SYSTOLIC BLOOD PRESSURE: 127 MMHG | OXYGEN SATURATION: 95 %

## 2019-08-07 DIAGNOSIS — C67.4 MALIGNANT NEOPLASM OF POSTERIOR WALL OF URINARY BLADDER (HCC): ICD-10-CM

## 2019-08-07 LAB
ERYTHROCYTE [DISTWIDTH] IN BLOOD BY AUTOMATED COUNT: 13.3 % (ref 11.6–15.1)
HCT VFR BLD AUTO: 44.1 % (ref 36.5–49.3)
HGB BLD-MCNC: 14.8 G/DL (ref 12–17)
MCH RBC QN AUTO: 30.8 PG (ref 26.8–34.3)
MCHC RBC AUTO-ENTMCNC: 33.6 G/DL (ref 31.4–37.4)
MCV RBC AUTO: 92 FL (ref 82–98)
PLATELET # BLD AUTO: 188 THOUSANDS/UL (ref 149–390)
PMV BLD AUTO: 8.4 FL (ref 8.9–12.7)
RBC # BLD AUTO: 4.81 MILLION/UL (ref 3.88–5.62)
WBC # BLD AUTO: 8.67 THOUSAND/UL (ref 4.31–10.16)

## 2019-08-07 PROCEDURE — 85027 COMPLETE CBC AUTOMATED: CPT | Performed by: UROLOGY

## 2019-08-07 PROCEDURE — 74420 UROGRAPHY RTRGR +-KUB: CPT

## 2019-08-07 PROCEDURE — 52234 CYSTOSCOPY AND TREATMENT: CPT | Performed by: UROLOGY

## 2019-08-07 PROCEDURE — 88305 TISSUE EXAM BY PATHOLOGIST: CPT | Performed by: PATHOLOGY

## 2019-08-07 PROCEDURE — 71046 X-RAY EXAM CHEST 2 VIEWS: CPT

## 2019-08-07 PROCEDURE — C1769 GUIDE WIRE: HCPCS | Performed by: UROLOGY

## 2019-08-07 RX ORDER — MIDAZOLAM HYDROCHLORIDE 1 MG/ML
INJECTION INTRAMUSCULAR; INTRAVENOUS AS NEEDED
Status: DISCONTINUED | OUTPATIENT
Start: 2019-08-07 | End: 2019-08-07 | Stop reason: SURG

## 2019-08-07 RX ORDER — PHENAZOPYRIDINE HYDROCHLORIDE 200 MG/1
200 TABLET, FILM COATED ORAL ONCE
Status: COMPLETED | OUTPATIENT
Start: 2019-08-07 | End: 2019-08-07

## 2019-08-07 RX ORDER — DEXAMETHASONE SODIUM PHOSPHATE 4 MG/ML
INJECTION, SOLUTION INTRA-ARTICULAR; INTRALESIONAL; INTRAMUSCULAR; INTRAVENOUS; SOFT TISSUE AS NEEDED
Status: DISCONTINUED | OUTPATIENT
Start: 2019-08-07 | End: 2019-08-07 | Stop reason: SURG

## 2019-08-07 RX ORDER — PHENAZOPYRIDINE HYDROCHLORIDE 200 MG/1
200 TABLET, FILM COATED ORAL
Qty: 10 TABLET | Refills: 0 | Status: SHIPPED | OUTPATIENT
Start: 2019-08-07 | End: 2019-08-27 | Stop reason: ALTCHOICE

## 2019-08-07 RX ORDER — FENTANYL CITRATE/PF 50 MCG/ML
50 SYRINGE (ML) INJECTION
Status: DISCONTINUED | OUTPATIENT
Start: 2019-08-07 | End: 2019-08-07 | Stop reason: HOSPADM

## 2019-08-07 RX ORDER — SODIUM CHLORIDE 9 MG/ML
125 INJECTION, SOLUTION INTRAVENOUS CONTINUOUS
Status: DISCONTINUED | OUTPATIENT
Start: 2019-08-07 | End: 2019-08-07 | Stop reason: HOSPADM

## 2019-08-07 RX ORDER — ONDANSETRON 2 MG/ML
INJECTION INTRAMUSCULAR; INTRAVENOUS AS NEEDED
Status: DISCONTINUED | OUTPATIENT
Start: 2019-08-07 | End: 2019-08-07 | Stop reason: SURG

## 2019-08-07 RX ORDER — FENTANYL CITRATE 50 UG/ML
INJECTION, SOLUTION INTRAMUSCULAR; INTRAVENOUS AS NEEDED
Status: DISCONTINUED | OUTPATIENT
Start: 2019-08-07 | End: 2019-08-07 | Stop reason: SURG

## 2019-08-07 RX ORDER — PROPOFOL 10 MG/ML
INJECTION, EMULSION INTRAVENOUS AS NEEDED
Status: DISCONTINUED | OUTPATIENT
Start: 2019-08-07 | End: 2019-08-07 | Stop reason: SURG

## 2019-08-07 RX ORDER — LEVOFLOXACIN 500 MG/1
500 TABLET, FILM COATED ORAL EVERY 24 HOURS
Qty: 3 TABLET | Refills: 0 | Status: SHIPPED | OUTPATIENT
Start: 2019-08-07 | End: 2019-08-10

## 2019-08-07 RX ORDER — LIDOCAINE HYDROCHLORIDE 20 MG/ML
INJECTION, SOLUTION EPIDURAL; INFILTRATION; INTRACAUDAL; PERINEURAL AS NEEDED
Status: DISCONTINUED | OUTPATIENT
Start: 2019-08-07 | End: 2019-08-07 | Stop reason: SURG

## 2019-08-07 RX ORDER — ONDANSETRON 2 MG/ML
4 INJECTION INTRAMUSCULAR; INTRAVENOUS ONCE AS NEEDED
Status: DISCONTINUED | OUTPATIENT
Start: 2019-08-07 | End: 2019-08-07 | Stop reason: HOSPADM

## 2019-08-07 RX ORDER — HYDROCODONE BITARTRATE AND ACETAMINOPHEN 5; 325 MG/1; MG/1
1 TABLET ORAL EVERY 6 HOURS PRN
Status: DISCONTINUED | OUTPATIENT
Start: 2019-08-07 | End: 2019-08-07 | Stop reason: HOSPADM

## 2019-08-07 RX ORDER — SORBITOL 30 G/1000ML
IRRIGANT IRRIGATION AS NEEDED
Status: DISCONTINUED | OUTPATIENT
Start: 2019-08-07 | End: 2019-08-07 | Stop reason: HOSPADM

## 2019-08-07 RX ORDER — LEVOFLOXACIN 5 MG/ML
750 INJECTION, SOLUTION INTRAVENOUS ONCE
Status: COMPLETED | OUTPATIENT
Start: 2019-08-07 | End: 2019-08-07

## 2019-08-07 RX ADMIN — SODIUM CHLORIDE 125 ML/HR: 0.9 INJECTION, SOLUTION INTRAVENOUS at 12:54

## 2019-08-07 RX ADMIN — DEXAMETHASONE SODIUM PHOSPHATE 4 MG: 4 INJECTION, SOLUTION INTRAMUSCULAR; INTRAVENOUS at 13:38

## 2019-08-07 RX ADMIN — LEVOFLOXACIN 750 MG: 5 INJECTION, SOLUTION INTRAVENOUS at 13:25

## 2019-08-07 RX ADMIN — MIDAZOLAM 2 MG: 1 INJECTION INTRAMUSCULAR; INTRAVENOUS at 13:28

## 2019-08-07 RX ADMIN — PHENAZOPYRIDINE HYDROCHLORIDE 200 MG: 200 TABLET ORAL at 16:45

## 2019-08-07 RX ADMIN — PROPOFOL 200 MG: 10 INJECTION, EMULSION INTRAVENOUS at 13:33

## 2019-08-07 RX ADMIN — ONDANSETRON 4 MG: 2 INJECTION INTRAMUSCULAR; INTRAVENOUS at 13:38

## 2019-08-07 RX ADMIN — LIDOCAINE HYDROCHLORIDE 60 MG: 20 INJECTION, SOLUTION EPIDURAL; INFILTRATION; INTRACAUDAL; PERINEURAL at 13:33

## 2019-08-07 RX ADMIN — FENTANYL CITRATE 100 MCG: 50 INJECTION, SOLUTION INTRAMUSCULAR; INTRAVENOUS at 13:44

## 2019-08-07 RX ADMIN — FENTANYL CITRATE 100 MCG: 50 INJECTION, SOLUTION INTRAMUSCULAR; INTRAVENOUS at 13:33

## 2019-08-07 NOTE — ANESTHESIA PREPROCEDURE EVALUATION
Review of Systems/Medical History  Patient summary reviewed  Chart reviewed  No history of anesthetic complications     Cardiovascular  Hypertension on > 1 medication,    Pulmonary  Smoker ex-smoker  ,        GI/Hepatic    GERD well controlled,        Genitourinary malignancy Bladder cancer,        Endo/Other    Obesity    GYN       Hematology   Musculoskeletal  Back pain , lumbar pain,   Arthritis     Neurology    Visual impairment (MD),    Psychology   Negative psychology ROS              Physical Exam    Airway  Comment: Full francis and moustache  Mallampati score: II  TM Distance: >3 FB  Neck ROM: full     Dental       Cardiovascular  Rhythm: regular, Rate: normal, Cardiovascular exam normal    Pulmonary  Pulmonary exam normal Breath sounds clear to auscultation,     Other Findings        Anesthesia Plan  ASA Score- 2     Anesthesia Type- general with ASA Monitors  Additional Monitors:   Airway Plan: LMA  Plan Factors-Patient not instructed to abstain from smoking on day of procedure  Patient did not smoke on day of surgery  Induction- intravenous  Postoperative Plan-     Informed Consent- Anesthetic plan and risks discussed with patient and spouse

## 2019-08-07 NOTE — ANESTHESIA POSTPROCEDURE EVALUATION
Post-Op Assessment Note    CV Status:  Stable    Pain management: adequate     Mental Status:  Alert and awake   Hydration Status:  Euvolemic   PONV Controlled:  Controlled   Airway Patency:  Patent   Post Op Vitals Reviewed: Yes      Staff: Anesthesiologist           /86 (08/07/19 1508)    Temp 97 5 °F (36 4 °C) (08/07/19 1508)    Pulse 102 (08/07/19 1508)   Resp 19 (08/07/19 1508)    SpO2 95 % (08/07/19 1508)

## 2019-08-07 NOTE — OP NOTE
OPERATIVE REPORT  PATIENT NAME: Rikki Dwyer    :  1960  MRN: [id number]  Pt Location: [facility] OR ROOM 03    SURGERY DATE: 2019    Surgeon(s) and Role:     * Dequan Hilliard MD - Primary    Preop Diagnosis:  Malignant neoplasm of posterior wall of urinary bladder ([address] ) [C67 4]    Post-Op Diagnosis Codes:     * Malignant neoplasm of posterior wall of urinary bladder ([address] ) [C67 4] malignant neoplasm of dome of bladder    Procedure(s) (LRB):  TRANSURETHRAL RESECTION OF BLADDER TUMOR (TURBT) (N/A)  INSTILLATION MITOMYCIN (N/A)  CYSTOSCOPY WITH RETROGRADE PYELOGRAM (Bilateral)    Specimen(s):  Left posterior wall bladder tumor, right bladder dome bladder tumor    Estimated Blood Loss:   Minimal    Drains:  No  Anesthesia Type:   General/LMA    Operative Indications:  Malignant neoplasm of posterior wall of urinary bladder ([address] ) [C67 4]      Operative Findings:  Small superficial recurrences left posterior wall and right dome  Normal retrogrades  Complications:   None    Procedure and Technique:  73-year-old man with a history of bladder cancer, with a 5 6 cm tumor in  was here for follow-up surveillance cystoscopy and I found a 1 cm recurrence in the left posterior wall  I have offered him cystoscopy, TURBT and bilateral retrograde pyelography and mitomycin installation  The risks of bleeding, infection and damage to the urinary tract and explained he gives informed consent  The patient was brought to the operating room and identified properly  LMA was induced the patient was prepped and draped the dorsal stopping position usual fashion  A time-out was performed  Cystoscopy was carried out with a 22 Malay cystoscopy sheath and 30 and 70 degree lens  There is a small superficial area of about 1 cm of papillary recurrence in the left posterior wall  I first performed retrograde pyelography with a tiger tail catheter 50% Conray    Both ureters were free of filling defects as well as the renal pelvis and calices  There are no strictures or other abnormalities  Both drained readily  His previous resection site of the left lateral wall showed no recurrence of tumor  There was also small papillary recurrence of the right dome  Using the cup biopsy forceps, I removed these tumors and took muscular bites  Once there completely removed, I fulgurated the bases with the Bugbee electrode  Both were very small so I did not wish to use a resectoscope loop which would cause cautery artifact and destroy the specimen  Once hemostasis was excellent, I placed a 16 American Xavier catheter and placed 40 mg of mitomycin C in 40 cc into the bladder and clamped the Xavier catheter  This will be drained in 30 minutes     I was present for the entire procedure and A qualified resident physician was not available    Patient Disposition:  PACU  and extubated and stable    SIGNATURE: Eber Olivera MD  DATE: August 7, 2019  TIME: 1:38 PM

## 2019-08-27 ENCOUNTER — OFFICE VISIT (OUTPATIENT)
Dept: UROLOGY | Facility: MEDICAL CENTER | Age: 59
End: 2019-08-27
Payer: COMMERCIAL

## 2019-08-27 VITALS
BODY MASS INDEX: 29.95 KG/M2 | HEART RATE: 103 BPM | DIASTOLIC BLOOD PRESSURE: 70 MMHG | SYSTOLIC BLOOD PRESSURE: 130 MMHG | WEIGHT: 226 LBS | HEIGHT: 73 IN

## 2019-08-27 DIAGNOSIS — C67.2 MALIGNANT NEOPLASM OF LATERAL WALL OF URINARY BLADDER (HCC): Primary | ICD-10-CM

## 2019-08-27 LAB
POST-VOID RESIDUAL VOLUME, ML POC: 20 ML
SL AMB  POCT GLUCOSE, UA: ABNORMAL
SL AMB LEUKOCYTE ESTERASE,UA: ABNORMAL
SL AMB POCT BILIRUBIN,UA: ABNORMAL
SL AMB POCT BLOOD,UA: ABNORMAL
SL AMB POCT CLARITY,UA: CLEAR
SL AMB POCT COLOR,UA: YELLOW
SL AMB POCT KETONES,UA: ABNORMAL
SL AMB POCT NITRITE,UA: ABNORMAL
SL AMB POCT PH,UA: 7
SL AMB POCT SPECIFIC GRAVITY,UA: 1.01
SL AMB POCT URINE PROTEIN: ABNORMAL
SL AMB POCT UROBILINOGEN: 0.2

## 2019-08-27 PROCEDURE — 81003 URINALYSIS AUTO W/O SCOPE: CPT | Performed by: UROLOGY

## 2019-08-27 PROCEDURE — 51798 US URINE CAPACITY MEASURE: CPT | Performed by: UROLOGY

## 2019-08-27 PROCEDURE — 99213 OFFICE O/P EST LOW 20 MIN: CPT | Performed by: UROLOGY

## 2019-08-27 NOTE — PROGRESS NOTES
[address] [facility]  [facility]  [address] [facility]  [place], [address] [place]  [phone]  www  [facility]  org      NAME: Yonatan Carolina  AGE: 62 y o  SEX: male  : 1960   MRN: [id number]    DATE: 2019  TIME: 1:40 PM    Assessment and Plan:    Bladder cancer, low-grade superficial recurrence-he had mitomycin and we will do cystoscopy in 3 months  Chief Complaint   No chief complaint on file  History of Present Illness   Bladder cancer:  Status post TURBT of small superficial recurrences left posterior wall and right dome with normal retrogrades and mitomycin installation 2019  This is his first recurrence  His first diagnosed with a 5 6 cm tumor in   Pathology shows low-grade papillary urothelial carcinoma in both spots, 1 small focus of detrusor muscle seen showing no malignancy  Therefore this is superficial low-grade noninvasive tumor  No problems after surgery  We discussed BCG adjuvant therapy  I have no strong feelings regarding this  He had a 6 week course with his original tumor in , and although this is a recurrence it is very small and superficial   As a matter of fact, I think BCG may be harmful to the bladder given and recurrent doses  Therefore we will not pursue BCG at this time but resume a 3 month surveillance cystoscopy for the next year      The following portions of the patient's history were reviewed and updated as appropriate: allergies, current medications, past family history, past medical history, past social history, past surgical history and problem list   Past Medical History:   Diagnosis Date    Arthritis     Back pain     Cancer of lateral wall of urinary bladder ([address] )     Cataract, right eye     Edema     GERD (gastroesophageal reflux disease)     Hematuria, gross     Herniated lumbar intervertebral disc     Hypertension     Macular degeneration      Past Surgical History: Procedure Laterality Date    BLADDER INSTILLATION  2015    BCG  X 6    CYSTOSCOPY  09/30/2015    W/ Irrigation of clots    FL RETROGRADE PYELOGRAM  8/7/2019    PA CYSTOURETHROSCOPY,FULGUR <0 5 CM LESN N/A 8/7/2019    Procedure: TRANSURETHRAL RESECTION OF BLADDER TUMOR (TURBT); Surgeon: Yusef Ramirez MD;  [facility];  Service: Urology    PA CYSTOURETHROSCOPY,URETER CATHETER Bilateral 8/7/2019    Procedure: CYSTOSCOPY WITH RETROGRADE PYELOGRAM;  Surgeon: Yusef Ramirez MD;  [facility];  Service: Urology    PA INSTILL ANTICANCER AGENT IN BLADDER N/A 8/7/2019    Procedure: Dhruv Cancino;  Surgeon: Yusef Ramirez MD;  [facility];  Service: Urology    TRANSURETHRAL RESECTION OF BLADDER TUMOR  09/2015     shoulder  Review of Systems   Review of Systems   Genitourinary: Negative  Active Problem List     Patient Active Problem List   Diagnosis    Malignant neoplasm of posterior wall of urinary bladder (HCC)       Objective   /70   Pulse 103   Ht 6' 1" (1 854 m)   Wt 103 kg (226 lb)   BMI 29 82 kg/m²     Physical Exam   Constitutional: He is oriented to person, place, and time  He appears well-developed and well-nourished  HENT:   Head: Normocephalic and atraumatic  Eyes: EOM are normal    Neck: Normal range of motion  Pulmonary/Chest: Effort normal    Musculoskeletal: Normal range of motion  Neurological: He is alert and oriented to person, place, and time  Skin: Skin is warm and dry  Psychiatric: He has a normal mood and affect   His behavior is normal  Judgment and thought content normal            Current Medications     Current Outpatient Medications:     amLODIPine (NORVASC) 10 mg tablet, Take 1 tablet by mouth, Disp: , Rfl:     lisinopril (ZESTRIL) 10 mg tablet, Take 10 mg by mouth daily, Disp: , Rfl:     Multiple Vitamins-Minerals (EYE VITAMINS PO), Take 1 tablet by mouth daily, Disp: , Rfl:     omeprazole (PriLOSEC OTC) 20 MG tablet, Take 20 mg by mouth daily, Disp: , Rfl:         Mary Puga MD

## 2019-12-06 ENCOUNTER — PROCEDURE VISIT (OUTPATIENT)
Dept: UROLOGY | Facility: MEDICAL CENTER | Age: 59
End: 2019-12-06
Payer: COMMERCIAL

## 2019-12-06 VITALS
WEIGHT: 226 LBS | HEIGHT: 73 IN | BODY MASS INDEX: 29.95 KG/M2 | HEART RATE: 77 BPM | SYSTOLIC BLOOD PRESSURE: 124 MMHG | DIASTOLIC BLOOD PRESSURE: 70 MMHG

## 2019-12-06 DIAGNOSIS — C67.2 MALIGNANT NEOPLASM OF LATERAL WALL OF URINARY BLADDER (HCC): Primary | ICD-10-CM

## 2019-12-06 LAB
SL AMB  POCT GLUCOSE, UA: ABNORMAL
SL AMB LEUKOCYTE ESTERASE,UA: ABNORMAL
SL AMB POCT BILIRUBIN,UA: ABNORMAL
SL AMB POCT BLOOD,UA: ABNORMAL
SL AMB POCT CLARITY,UA: ABNORMAL
SL AMB POCT COLOR,UA: YELLOW
SL AMB POCT KETONES,UA: ABNORMAL
SL AMB POCT NITRITE,UA: ABNORMAL
SL AMB POCT PH,UA: 7
SL AMB POCT SPECIFIC GRAVITY,UA: 1.01
SL AMB POCT URINE PROTEIN: ABNORMAL
SL AMB POCT UROBILINOGEN: 0.2

## 2019-12-06 PROCEDURE — 99213 OFFICE O/P EST LOW 20 MIN: CPT | Performed by: UROLOGY

## 2019-12-06 PROCEDURE — 52000 CYSTOURETHROSCOPY: CPT | Performed by: UROLOGY

## 2019-12-06 PROCEDURE — 81003 URINALYSIS AUTO W/O SCOPE: CPT | Performed by: UROLOGY

## 2019-12-06 RX ORDER — CIPROFLOXACIN 500 MG/1
500 TABLET, FILM COATED ORAL ONCE
Qty: 14 TABLET | Refills: 0 | Status: SHIPPED | OUTPATIENT
Start: 2019-12-06 | End: 2019-12-06

## 2019-12-06 NOTE — PROGRESS NOTES
[address] [facility]  [facility]  [address] [facility], [address] [place] [address]  [phone]  www  [facility]  org      NAME: Lukasz Last  AGE: 62 y o  SEX: male  : 1960   MRN: [id number]    DATE: 2019  TIME: 10:27 AM    Assessment and Plan:    Bladder cancer, and no evidence recurrence 4 months after TURBT  Follow-up 3 months for another surveillance cystoscopy  Chief Complaint   No chief complaint on file  History of Present Illness   Bladder CA- status post TURBT of small superficial recurrences left posterior wall and right dome with normal retrogrades of mitomycin installation 2019  This was his first recurrence, and he was first diagnosed with a 5 6 cm tumor   The last pathology showed low-grade papillary urothelial carcinoma in both spots, 1 small focus of detrusor muscle seen showing no malignancy  The only BCG he has received was with the original tumor in   Here for surveillance cystoscopy  Cystoscopy  Date/Time: 2019 10:33 AM  Performed by: Riky Cornell MD  Authorized by: Riky Cornell MD     Procedure details: cystoscopy    Additional Procedure Details: Cystoscopy Procedure Note        Pre-operative Diagnosis:  Bladder cancer, surveillance cystoscopy    Post-operative Diagnosis:  Same , no recurrence    Procedure: Flexible cystoscopy    Surgeon: Sandi Giang MD    Anesthesia: 1% Xylocaine per urethra    EBL: Minimal    Complications: none    Procedure Details   The risks, benefits, complications, treatment options, and expected outcomes were discussed with the patient  The patient concurred with the proposed plan, giving informed consent  Cystoscopy was performed today under local anesthesia, using sterile technique  The patient was placed in the supine position, prepped with Betadine, and draped in the usual sterile fashion   The flexible cystocope was used to inspect both the urethra and bladder    Findings:  Urethra:Normal without stricture  Prostate nonobstructive  Bladder:  Smooth, not trabeculated and there were no stones tumors or other lesions  The orifices were orthotopic and intact  No recurrence  Specimens:  None                 Complications:  None           Disposition: To home            Condition:  Stable              The following portions of the patient's history were reviewed and updated as appropriate: allergies, current medications, past family history, past medical history, past social history, past surgical history and problem list   Past Medical History:   Diagnosis Date    Arthritis     Back pain     Cancer of lateral wall of urinary bladder ([address] )     Cataract, right eye     Edema     GERD (gastroesophageal reflux disease)     Hematuria, gross     Herniated lumbar intervertebral disc     Hypertension     Macular degeneration      Past Surgical History:   Procedure Laterality Date    BLADDER INSTILLATION  2015    BCG  X 6    CYSTOSCOPY  09/30/2015    W/ Irrigation of clots    FL RETROGRADE PYELOGRAM  8/7/2019    [place] CYSTOURETHROSCOPY,FULGUR <0 5 CM LESN N/A 8/7/2019    Procedure: TRANSURETHRAL RESECTION OF BLADDER TUMOR (TURBT);   Surgeon: Bernarda Fagan MD;  [facility];  Service: Urology    KY CYSTOURETHROSCOPY,URETER CATHETER Bilateral 8/7/2019    Procedure: CYSTOSCOPY WITH RETROGRADE PYELOGRAM;  Surgeon: Bernarda Fagan MD;  [facility];  Service: Urology    [place] INSTILL ANTICANCER AGENT IN BLADDER N/A 8/7/2019    Procedure: Rashmi Merino;  Surgeon: Bernarda Fagan MD;  [facility];  Service: Urology    TRANSURETHRAL RESECTION OF BLADDER TUMOR  09/2015     shoulder  Review of Systems   Review of Systems    Active Problem List     Patient Active Problem List   Diagnosis    Malignant neoplasm of posterior wall of urinary bladder (HCC)       Objective   /70   Pulse 77   Ht 6' 1" (1 854 m)   Wt 103 kg (226 lb) BMI 29 82 kg/m²     Physical Exam   Constitutional: He is oriented to person, place, and time  He appears well-developed and well-nourished  HENT:   Head: Normocephalic and atraumatic  Eyes: EOM are normal    Neck: Normal range of motion  Pulmonary/Chest: Effort normal    Musculoskeletal: Normal range of motion  Neurological: He is alert and oriented to person, place, and time  Skin: Skin is warm and dry  Psychiatric: He has a normal mood and affect   His behavior is normal  Judgment and thought content normal            Current Medications     Current Outpatient Medications:     amLODIPine (NORVASC) 10 mg tablet, Take 1 tablet by mouth, Disp: , Rfl:     lisinopril (ZESTRIL) 10 mg tablet, Take 10 mg by mouth daily, Disp: , Rfl:     Multiple Vitamins-Minerals (EYE VITAMINS PO), Take 1 tablet by mouth daily, Disp: , Rfl:     omeprazole (PriLOSEC OTC) 20 MG tablet, Take 20 mg by mouth daily, Disp: , Rfl:         Rodrigo Shaffer MD

## 2019-12-06 NOTE — LETTER
2019     [address] [facility] [address] [facility]    Patient: Toño Found   YOB: 1960   Date of Visit: 2019       Dear Dr Loretta Metz:    Thank you for referring Toño Found to me for evaluation  Below are my notes for this consultation  If you have questions, please do not hesitate to call me  I look forward to following your patient along with you  Sincerely,        eDisy Lackey MD        CC: No Recipients  Deisy Lackey MD  2019 10:45 AM  Sign at close encounter  [address] [facility]  [address] [facility], [address] [place] [address]  [phone]  www  [facility]  org      NAME: Toño Found  AGE: 62 y o  SEX: male  : 1960   MRN: [id number]    DATE: 2019  TIME: 10:27 AM    Assessment and Plan:    Bladder cancer, and no evidence recurrence 4 months after TURBT  Follow-up 3 months for another surveillance cystoscopy  Chief Complaint   No chief complaint on file  History of Present Illness   Bladder CA- status post TURBT of small superficial recurrences left posterior wall and right dome with normal retrogrades of mitomycin installation 2019  This was his first recurrence, and he was first diagnosed with a 5 6 cm tumor   The last pathology showed low-grade papillary urothelial carcinoma in both spots, 1 small focus of detrusor muscle seen showing no malignancy  The only BCG he has received was with the original tumor in   Here for surveillance cystoscopy      Cystoscopy  Date/Time: 2019 10:33 AM  Performed by: Deisy Lackey MD  Authorized by: Deisy Lackey MD     Procedure details: cystoscopy    Additional Procedure Details: Cystoscopy Procedure Note        Pre-operative Diagnosis:  Bladder cancer, surveillance cystoscopy    Post-operative Diagnosis:  Same , no recurrence    Procedure: Flexible cystoscopy    Surgeon: Isra Coleman MD    Anesthesia: 1% Xylocaine per urethra    EBL: Minimal    Complications: none    Procedure Details   The risks, benefits, complications, treatment options, and expected outcomes were discussed with the patient  The patient concurred with the proposed plan, giving informed consent  Cystoscopy was performed today under local anesthesia, using sterile technique  The patient was placed in the supine position, prepped with Betadine, and draped in the usual sterile fashion  The flexible cystocope was used to inspect both the urethra and bladder    Findings:  Urethra:Normal without stricture  Prostate nonobstructive  Bladder:  Smooth, not trabeculated and there were no stones tumors or other lesions  The orifices were orthotopic and intact  No recurrence  Specimens:  None                 Complications:  None           Disposition: To home            Condition:  Stable              The following portions of the patient's history were reviewed and updated as appropriate: allergies, current medications, past family history, past medical history, past social history, past surgical history and problem list   Past Medical History:   Diagnosis Date    Arthritis     Back pain     Cancer of lateral wall of urinary bladder ([address] )     Cataract, right eye     Edema     GERD (gastroesophageal reflux disease)     Hematuria, gross     Herniated lumbar intervertebral disc     Hypertension     Macular degeneration      Past Surgical History:   Procedure Laterality Date    BLADDER INSTILLATION  2015    BCG  X 6    CYSTOSCOPY  09/30/2015    W/ Irrigation of clots    FL RETROGRADE PYELOGRAM  8/7/2019    [place] CYSTOURETHROSCOPY,FULGUR <0 5 CM LESN N/A 8/7/2019    Procedure: TRANSURETHRAL RESECTION OF BLADDER TUMOR (TURBT);   Surgeon: Juan Vásquez MD;  [facility];  Service: Urology    [place] CYSTOURETHROSCOPY,URETER CATHETER Bilateral 8/7/2019    Procedure: CYSTOSCOPY WITH RETROGRADE PYELOGRAM;  Surgeon: Jeremías Carreon MD;  [facility];  Service: Urology    [place] INSTILL ANTICANCER AGENT IN BLADDER N/A 8/7/2019    Procedure: Diane Johnson;  Surgeon: Jeremías Carreon MD;  [facility];  Service: Urology    TRANSURETHRAL RESECTION OF BLADDER TUMOR  09/2015     shoulder  Review of Systems   Review of Systems    Active Problem List     Patient Active Problem List   Diagnosis    Malignant neoplasm of posterior wall of urinary bladder (HCC)       Objective   /70   Pulse 77   Ht 6' 1" (1 854 m)   Wt 103 kg (226 lb)   BMI 29 82 kg/m²      Physical Exam   Constitutional: He is oriented to person, place, and time  He appears well-developed and well-nourished  HENT:   Head: Normocephalic and atraumatic  Eyes: EOM are normal    Neck: Normal range of motion  Pulmonary/Chest: Effort normal    Musculoskeletal: Normal range of motion  Neurological: He is alert and oriented to person, place, and time  Skin: Skin is warm and dry  Psychiatric: He has a normal mood and affect   His behavior is normal  Judgment and thought content normal            Current Medications     Current Outpatient Medications:     amLODIPine (NORVASC) 10 mg tablet, Take 1 tablet by mouth, Disp: , Rfl:     lisinopril (ZESTRIL) 10 mg tablet, Take 10 mg by mouth daily, Disp: , Rfl:     Multiple Vitamins-Minerals (EYE VITAMINS PO), Take 1 tablet by mouth daily, Disp: , Rfl:     omeprazole (PriLOSEC OTC) 20 MG tablet, Take 20 mg by mouth daily, Disp: , Rfl:         Jeremías Carreon MD

## 2020-03-03 ENCOUNTER — TELEPHONE (OUTPATIENT)
Dept: UROLOGY | Facility: MEDICAL CENTER | Age: 60
End: 2020-03-03

## 2020-03-03 NOTE — TELEPHONE ENCOUNTER
Wife called to update insurance information    Information already added to system unable to take old insurance out please assist

## 2020-03-06 ENCOUNTER — PROCEDURE VISIT (OUTPATIENT)
Dept: UROLOGY | Facility: MEDICAL CENTER | Age: 60
End: 2020-03-06
Payer: COMMERCIAL

## 2020-03-06 VITALS
DIASTOLIC BLOOD PRESSURE: 84 MMHG | BODY MASS INDEX: 30.64 KG/M2 | SYSTOLIC BLOOD PRESSURE: 128 MMHG | HEIGHT: 73 IN | HEART RATE: 79 BPM | WEIGHT: 231.2 LBS

## 2020-03-06 DIAGNOSIS — C67.4 MALIGNANT NEOPLASM OF POSTERIOR WALL OF URINARY BLADDER (HCC): Primary | ICD-10-CM

## 2020-03-06 DIAGNOSIS — C67.2 MALIGNANT NEOPLASM OF LATERAL WALL OF URINARY BLADDER (HCC): ICD-10-CM

## 2020-03-06 DIAGNOSIS — Z12.5 ENCOUNTER FOR PROSTATE CANCER SCREENING: ICD-10-CM

## 2020-03-06 LAB
SL AMB  POCT GLUCOSE, UA: NEGATIVE
SL AMB LEUKOCYTE ESTERASE,UA: NORMAL
SL AMB POCT BILIRUBIN,UA: NEGATIVE
SL AMB POCT BLOOD,UA: NEGATIVE
SL AMB POCT CLARITY,UA: CLEAR
SL AMB POCT COLOR,UA: YELLOW
SL AMB POCT KETONES,UA: NEGATIVE
SL AMB POCT NITRITE,UA: NEGATIVE
SL AMB POCT PH,UA: 7
SL AMB POCT SPECIFIC GRAVITY,UA: 1.01
SL AMB POCT URINE PROTEIN: NEGATIVE
SL AMB POCT UROBILINOGEN: 0.2

## 2020-03-06 PROCEDURE — 52000 CYSTOURETHROSCOPY: CPT | Performed by: UROLOGY

## 2020-03-06 PROCEDURE — 99212 OFFICE O/P EST SF 10 MIN: CPT | Performed by: UROLOGY

## 2020-03-06 PROCEDURE — 81003 URINALYSIS AUTO W/O SCOPE: CPT | Performed by: UROLOGY

## 2020-03-06 RX ORDER — CIPROFLOXACIN 500 MG/1
TABLET, FILM COATED ORAL
COMMUNITY
Start: 2019-12-06 | End: 2022-07-27 | Stop reason: SDUPTHER

## 2020-03-06 NOTE — PROGRESS NOTES
[address] [facility]  [facility]  [address] [facility] [place]  [place], [address] [place] [address]  [phone]  www  [facility]  org      NAME: Suleman Ivory  AGE: 62 y o  SEX: male  : 1960   MRN: [id number]    DATE: 3/6/2020  TIME: 10:46 AM    Assessment and Plan :    Bladder cancer, last TURBT 2019, follow-up 3 months for cystoscopy  Prostate cancer screening with PSA in 6 months  Chief Complaint   No chief complaint on file  History of Present Illness   Bladder CA- status post TURBT of small superficial recurrences left posterior wall and right dome with normal retrogrades of mitomycin installation 2019  This was his first recurrence, and he was first diagnosed with a 5 6 cm tumor   The last pathology showed low-grade papillary urothelial carcinoma in both spots, 1 small focus of detrusor muscle seen showing no malignancy  The only BCG he has received was with the original tumor in   Here for surveillance cystoscopy  Urinalysis is negative  Cystoscopy  Date/Time: 3/6/2020 10:47 AM  Performed by: Joey Birmingham MD  Authorized by: Joey Birmingham MD     Procedure details: cystoscopy    Additional Procedure Details: Cystoscopy Procedure Note        Pre-operative Diagnosis:  Bladder cancer, here for surveillance cystoscopy    Post-operative Diagnosis:  Same, no recurrence    Procedure: Flexible cystoscopy    Surgeon: Harriett Garduno MD    Anesthesia: 1% Xylocaine per urethra    EBL: Minimal    Complications: none    Procedure Details   The risks, benefits, complications, treatment options, and expected outcomes were discussed with the patient  The patient concurred with the proposed plan, giving informed consent  Cystoscopy was performed today under local anesthesia, using sterile technique  The patient was placed in the supine position, prepped with Betadine, and draped in the usual sterile fashion   The flexible cystocope was used to inspect both the urethra and bladder    Findings:  Urethra:  Normal without stricture, prostate   Moderateocclusive  Bladder:  Smooth, not trabeculated and there were no stones tumors or other lesions  The orifices were orthotopic and intact  No recurrence  Specimens:  None                 Complications:  None           Disposition: To home            Condition:  Stable          The following portions of the patient's history were reviewed and updated as appropriate: allergies, current medications, past family history, past medical history, past social history, past surgical history and problem list   Past Medical History:   Diagnosis Date    Arthritis     Back pain     Cancer of lateral wall of urinary bladder ([address] )     Cataract, right eye     Edema     GERD (gastroesophageal reflux disease)     Hematuria, gross     Herniated lumbar intervertebral disc     Hypertension     Macular degeneration      Past Surgical History:   Procedure Laterality Date    BLADDER INSTILLATION  2015    BCG  X 6    CYSTOSCOPY  09/30/2015    W/ Irrigation of clots    FL RETROGRADE PYELOGRAM  8/7/2019    NM CYSTOURETHROSCOPY,FULGUR <0 5 CM LESN N/A 8/7/2019    Procedure: TRANSURETHRAL RESECTION OF BLADDER TUMOR (TURBT);   Surgeon: Soo Edward MD;  [facility];  Service: Urology    NM CYSTOURETHROSCOPY,URETER CATHETER Bilateral 8/7/2019    Procedure: CYSTOSCOPY WITH RETROGRADE PYELOGRAM;  Surgeon: Soo Edward MD;  [facility];  Service: Urology    NM INSTILL ANTICANCER AGENT IN BLADDER N/A 8/7/2019    Procedure: Terrance Bear;  Surgeon: Soo Edward MD;  [facility];  Service: Urology    TRANSURETHRAL RESECTION OF BLADDER TUMOR  09/2015     shoulder  Review of Systems   Review of Systems    Active Problem List     Patient Active Problem List   Diagnosis    Malignant neoplasm of posterior wall of urinary bladder (HCC)       Objective   /84 (BP Location: Left arm, Patient Position: Sitting, Cuff Size: Standard)   Pulse 79   Ht 6' 1" (1 854 m)   Wt 105 kg (231 lb 3 2 oz)   BMI 30 50 kg/m²     Physical Exam        Current Medications     Current Outpatient Medications:     amLODIPine (NORVASC) 10 mg tablet, Take 1 tablet by mouth, Disp: , Rfl:     ciprofloxacin (CIPRO) 500 mg tablet, TAKE 1 TABLET BY MOUTH ONCE FOR 1 DOSE TAKE PRIOR TO PROCEDURE, Disp: , Rfl:     lisinopril (ZESTRIL) 10 mg tablet, Take 10 mg by mouth daily, Disp: , Rfl:     Multiple Vitamins-Minerals (EYE VITAMINS PO), Take 1 tablet by mouth daily, Disp: , Rfl:     omeprazole (PriLOSEC OTC) 20 MG tablet, Take 20 mg by mouth as needed , Disp: , Rfl:         Luis Alberto Tapia MD

## 2020-03-06 NOTE — LETTER
2020     Lynette Wu, DO  1978 [address]    Patient: Alyson Terry   YOB: 1960   Date of Visit: 3/6/2020       Dear Dr Patricia Garcia:    Thank you for referring Alyson Terry to me for evaluation  Below are my notes for this consultation  If you have questions, please do not hesitate to call me  I look forward to following your patient along with you  Sincerely,        Yamilex Luz MD        CC: No Recipients  Yamilex Luz MD  3/6/2020 10:58 AM  Sign at close encounter  [address] [facility]  [address], [address] [place] [phone]  www  [facility]  org      NAME: Alyson Terry  AGE: 62 y o  SEX: male  : 1960   MRN: [id number]    DATE: 3/6/2020  TIME: 10:46 AM    Assessment and Plan :    Bladder cancer, last TURBT 2019, follow-up 3 months for cystoscopy  Prostate cancer screening with PSA in 6 months  Chief Complaint   No chief complaint on file  History of Present Illness   Bladder CA- status post TURBT of small superficial recurrences left posterior wall and right dome with normal retrogrades of mitomycin installation 2019  This was his first recurrence, and he was first diagnosed with a 5 6 cm tumor   The last pathology showed low-grade papillary urothelial carcinoma in both spots, 1 small focus of detrusor muscle seen showing no malignancy  The only BCG he has received was with the original tumor in   Here for surveillance cystoscopy  Urinalysis is negative    Cystoscopy  Date/Time: 3/6/2020 10:47 AM  Performed by: Yamilex Luz MD  Authorized by: Yamilex Luz MD     Procedure details: cystoscopy    Additional Procedure Details: Cystoscopy Procedure Note        Pre-operative Diagnosis:  Bladder cancer, here for surveillance cystoscopy    Post-operative Diagnosis:  Same, no recurrence    Procedure: Flexible cystoscopy    Surgeon: Edwar Amaro Navneet Shell MD    Anesthesia: 1% Xylocaine per urethra    EBL: Minimal    Complications: none    Procedure Details   The risks, benefits, complications, treatment options, and expected outcomes were discussed with the patient  The patient concurred with the proposed plan, giving informed consent  Cystoscopy was performed today under local anesthesia, using sterile technique  The patient was placed in the supine position, prepped with Betadine, and draped in the usual sterile fashion  The flexible cystocope was used to inspect both the urethra and bladder    Findings:  Urethra:  Normal without stricture, prostate   Moderateocclusive  Bladder:  Smooth, not trabeculated and there were no stones tumors or other lesions  The orifices were orthotopic and intact  No recurrence  Specimens:  None                 Complications:  None           Disposition: To home            Condition:  Stable          The following portions of the patient's history were reviewed and updated as appropriate: allergies, current medications, past family history, past medical history, past social history, past surgical history and problem list   Past Medical History:   Diagnosis Date    Arthritis     Back pain     Cancer of lateral wall of urinary bladder ([address] )     Cataract, right eye     Edema     GERD (gastroesophageal reflux disease)     Hematuria, gross     Herniated lumbar intervertebral disc     Hypertension     Macular degeneration      Past Surgical History:   Procedure Laterality Date    BLADDER INSTILLATION  2015    BCG  X 6    CYSTOSCOPY  09/30/2015    W/ Irrigation of clots    FL RETROGRADE PYELOGRAM  8/7/2019    AK CYSTOURETHROSCOPY,FULGUR <0 5 CM LESN N/A 8/7/2019    Procedure: TRANSURETHRAL RESECTION OF BLADDER TUMOR (TURBT);   Surgeon: Selvin Craig MD;  [facility];  Service: Urology    AK CYSTOURETHROSCOPY,URETER CATHETER Bilateral 8/7/2019    Procedure: CYSTOSCOPY WITH RETROGRADE PYELOGRAM; Surgeon: Sedrick Riggins MD;  [facility];  Service: Urology    LA INSTILL ANTICANCER AGENT IN BLADDER N/A 8/7/2019    Procedure: Zafar Bocanegra;  Surgeon: Sedrick Riggins MD;  [facility];  Service: Urology    TRANSURETHRAL RESECTION OF BLADDER TUMOR  09/2015     shoulder  Review of Systems   Review of Systems    Active Problem List     Patient Active Problem List   Diagnosis    Malignant neoplasm of posterior wall of urinary bladder (HCC)       Objective   /84 (BP Location: Left arm, Patient Position: Sitting, Cuff Size: Standard)   Pulse 79   Ht 6' 1" (1 854 m)   Wt 105 kg (231 lb 3 2 oz)   BMI 30 50 kg/m²      Physical Exam        Current Medications     Current Outpatient Medications:     amLODIPine (NORVASC) 10 mg tablet, Take 1 tablet by mouth, Disp: , Rfl:     ciprofloxacin (CIPRO) 500 mg tablet, TAKE 1 TABLET BY MOUTH ONCE FOR 1 DOSE TAKE PRIOR TO PROCEDURE, Disp: , Rfl:     lisinopril (ZESTRIL) 10 mg tablet, Take 10 mg by mouth daily, Disp: , Rfl:     Multiple Vitamins-Minerals (EYE VITAMINS PO), Take 1 tablet by mouth daily, Disp: , Rfl:     omeprazole (PriLOSEC OTC) 20 MG tablet, Take 20 mg by mouth as needed , Disp: , Rfl:         Sedrick Riggins MD

## 2020-06-12 ENCOUNTER — PROCEDURE VISIT (OUTPATIENT)
Dept: UROLOGY | Facility: MEDICAL CENTER | Age: 60
End: 2020-06-12
Payer: COMMERCIAL

## 2020-06-12 VITALS — WEIGHT: 239 LBS | BODY MASS INDEX: 31.68 KG/M2 | HEIGHT: 73 IN | TEMPERATURE: 97.6 F

## 2020-06-12 DIAGNOSIS — C67.2 MALIGNANT NEOPLASM OF LATERAL WALL OF URINARY BLADDER (HCC): Primary | ICD-10-CM

## 2020-06-12 DIAGNOSIS — C67.4 MALIGNANT NEOPLASM OF POSTERIOR WALL OF URINARY BLADDER (HCC): ICD-10-CM

## 2020-06-12 LAB
SL AMB  POCT GLUCOSE, UA: ABNORMAL
SL AMB LEUKOCYTE ESTERASE,UA: ABNORMAL
SL AMB POCT BILIRUBIN,UA: ABNORMAL
SL AMB POCT BLOOD,UA: ABNORMAL
SL AMB POCT CLARITY,UA: CLEAR
SL AMB POCT COLOR,UA: YELLOW
SL AMB POCT KETONES,UA: ABNORMAL
SL AMB POCT NITRITE,UA: ABNORMAL
SL AMB POCT PH,UA: 7.5
SL AMB POCT SPECIFIC GRAVITY,UA: 1.01
SL AMB POCT URINE PROTEIN: ABNORMAL
SL AMB POCT UROBILINOGEN: 0.2

## 2020-06-12 PROCEDURE — 52000 CYSTOURETHROSCOPY: CPT | Performed by: UROLOGY

## 2020-06-12 PROCEDURE — 99212 OFFICE O/P EST SF 10 MIN: CPT | Performed by: UROLOGY

## 2020-06-12 PROCEDURE — 81003 URINALYSIS AUTO W/O SCOPE: CPT | Performed by: UROLOGY

## 2020-07-08 NOTE — PROGRESS NOTES
[address] [facility]  [facility]  [address] [facility] [place]  [place], [address] [place] [address]  [phone]  www  [facility]  org      NAME: Nelly Manning  AGE: 62 y o  SEX: male  : 1960   MRN: [id number]    DATE: 2019  TIME: 8:42 AM    Assessment and Plan:    Bladder cancer, with minor recurrence 1 cm tumor left posterior wall  Plan cystoscopy, TURBT and mitomycin installation  The risks of bleeding infection and damage to the bladder explained he gives informed consent  His previous tumor was 5 cm  Chief Complaint   No chief complaint on file  History of Present Illness   Bladder cancer:  Status post TURBT 2015, low-grade papillary TCC followed by BCG  He had 5 cm initial tumor which was reason for adjuvant treatment  He is here for his 4 years surveillance cystoscopy  Since I last saw him, he had rapid onset macular degeneration and is legally blind  Encounter for prostate cancer screening:  No recent PSA  Last PSA was 0 6 2018  Cystoscopy  Date/Time: 2019 8:44 AM  Performed by: Donald Camp MD  Authorized by: Donald Camp MD     Procedure details: cystoscopy    Additional Procedure Details: Cystoscopy Procedure Note        Pre-operative Diagnosis:  Bladder cancer status post TURBT , here for surveillance    Post-operative Diagnosis:  Same    Procedure: Flexible cystoscopy    Surgeon: Antoni Banuelos MD    Anesthesia: 1% Xylocaine per urethra    EBL: Minimal    Complications: none    Procedure Details   The risks, benefits, complications, treatment options, and expected outcomes were discussed with the patient  The patient concurred with the proposed plan, giving informed consent  Cystoscopy was performed today under local anesthesia, using sterile technique  The patient was placed in the supine position, prepped with Betadine, and draped in the usual sterile fashion   The flexible cystocope was used to inspect both the Chief Complaint   Patient presents with   • Sore Throat     Sore throat x3 days. Exposure to COVID-19 at work ([employer]) 1.5 weeks ago.   • Headache     Headache on/off x4-5 days     Full PPE worn.     HPI:   This 18 year old  female presents to Urgent Care for evaluation of a sore throat and headache. Patient's chief complaint documented by nursing staff was reviewed. Patient reports she has had an intermittent headache for the past 4-5 days. She developed a sore throat 3 days ago. Patient had a known exposure to Covid at work. Patient has been taking Tylenol for the symptoms. Patient has been eating and drinking. Denies fever or chills. Denies URI symptoms. Denies cough or shortness of breath. Denies loss of taste or smell. Denies diarrhea. Denies body aches. Denies any known exposure to strep. Patient is allergic to Norco and cefuroxime.     Review of Systems:  A review of systems was performed and findings relevant to these symptoms are included in the HPI.    Social History:  Social History     Tobacco Use   Smoking Status Never Smoker   Smokeless Tobacco Never Used       Current Outpatient Medications   Medication Sig Dispense Refill   • sulfacetamide-sulfur 8-4 % topical suspension      • norethindrone-ethinyl estradiol (NORTREL 1/35, 28,) 1-35 MG-MCG per tablet TAKE 1 ACTIVE TABLET BY MOUTH CONTINUOUSLY CPE due for further refills 84 tablet 1     No current facility-administered medications for this visit.        ALLERGIES:   Allergen Reactions   • Adhesive   (Environmental) HIVES   • Latex HIVES   • Cefuroxime Axetil GI UPSET   • Norco [Hydrocodone-Acetaminophen] RASH, PRURITUS and Nausea & Vomiting       OBJECTIVE:  Vitals:    07/08/20 1044   BP: 110/70   Pulse: 96   Resp: 16   Temp: 98.2 °F (36.8 °C)   TempSrc: Oral   SpO2: 97%   Weight: 59 kg       Vitals were reviewed.     Physical Exam:  Constitutional: This pleasant 18 year old female presents to Urgent Care in no acute distress. Patient  is alert and oriented x3.  Head: Normocephalic, atraumatic.  Eyes: Conjunctiva are clear without injection, inflammation or drainage.   Ears: External auditory canals and tympanic membranes clear.  Nose: Nasal mucosa is pink and moist without erythema, edema or drainage.  Mouth: Oropharynx pink and moist with mild erythema and edema. No exudates. Uvula midline.  Neck: Supple, non tender. No anterior, posterior or supraclavicular lymphadenopathy present. Trachea midline.  Lungs: Respirations are regular and non labored. Clear to auscultation in all fields.  Cardiac: Heart rate and rhythm are regular. Normal S1, S2. No murmur ausculatated.    Diagnostics:   Results for orders placed or performed in visit on 07/08/20   GROUP A STREP THROAT PCR   Result Value    STREPTOCOCCUS GROUP A PCR Not Detected     Comment: This result was obtained by RT-PCR amplification followed by fluorescence detection. This test has been cleared by the U.S. Food and Drug Administration for detection of Strep A.     Covid 19 test pending.    ASSESSMENT:  Pharyngitis  Exposure to Covid 19    PLAN:  - Stay home in quarantine until Covid results are back.  - May take Tylenol as needed for pain or fever.  - Rest and push fluids.  - Written discharge instructions were given for Covid 19 and viral syndrome.  - Follow up with PCP in 5 days if symptoms do not improve and Covid test is negative, sooner if symptoms worsen.    KATALINA Eisenberg   urethra and bladder    Findings:  Urethra:  Normal without stricture  Moderate prostatic occlusion  Bladder:  Smooth, not trabeculated and there is a 1 cm recurrence left posterior wall     The orifices were orthotopic and intact  Specimens:  None                 Complications:  None           Disposition: To home            Condition:  Stable          The following portions of the patient's history were reviewed and updated as appropriate: allergies, current medications, past family history, past medical history, past social history, past surgical history and problem list   Past Medical History:   Diagnosis Date    Cancer of lateral wall of urinary bladder ([address] )     Cataract, right eye     Edema     Hematuria, gross     Hypertension      Past Surgical History:   Procedure Laterality Date    BLADDER INSTILLATION  2015    BCG  X 6    CYSTOSCOPY  09/30/2015    W/ Irrigation of clots    TRANSURETHRAL RESECTION OF BLADDER TUMOR  09/2015     shoulder  Review of Systems   Review of Systems   Eyes:        Legal blind due to macular degeneration   Genitourinary: Negative  Active Problem List   There is no problem list on file for this patient  Objective   There were no vitals taken for this visit  Physical Exam   Constitutional: He is oriented to person, place, and time  He appears well-developed and well-nourished  HENT:   Head: Normocephalic and atraumatic  Eyes: EOM are normal    Neck: Normal range of motion  Cardiovascular: Regular rhythm and normal heart sounds  Exam reveals no friction rub  No murmur heard  Tachycardic   Pulmonary/Chest: Effort normal and breath sounds normal    Abdominal: Soft  Genitourinary: Penis normal    Musculoskeletal: Normal range of motion  Neurological: He is alert and oriented to person, place, and time  Skin: Skin is warm and dry  Psychiatric: He has a normal mood and affect   His behavior is normal  Judgment and thought content normal  Current Medications     Current Outpatient Medications:     amLODIPine (NORVASC) 10 mg tablet, Take 1 tablet by mouth, Disp: , Rfl:     lisinopril (ZESTRIL) 10 mg tablet, Take 10 mg by mouth daily, Disp: , Rfl:         Branden Cat MD

## 2020-09-28 ENCOUNTER — TELEPHONE (OUTPATIENT)
Dept: UROLOGY | Facility: MEDICAL CENTER | Age: 60
End: 2020-09-28

## 2020-09-28 NOTE — TELEPHONE ENCOUNTER
----- Message from Iggy Jessica MD sent at 9/25/2020  6:45 PM EDT -----  Please inform patient that the results are normal

## 2021-01-22 ENCOUNTER — PROCEDURE VISIT (OUTPATIENT)
Dept: UROLOGY | Facility: MEDICAL CENTER | Age: 61
End: 2021-01-22
Payer: COMMERCIAL

## 2021-01-22 VITALS
SYSTOLIC BLOOD PRESSURE: 120 MMHG | HEIGHT: 73 IN | BODY MASS INDEX: 33.53 KG/M2 | WEIGHT: 253 LBS | DIASTOLIC BLOOD PRESSURE: 76 MMHG | HEART RATE: 83 BPM

## 2021-01-22 DIAGNOSIS — C67.2 MALIGNANT NEOPLASM OF LATERAL WALL OF URINARY BLADDER (HCC): Primary | ICD-10-CM

## 2021-01-22 LAB
SL AMB  POCT GLUCOSE, UA: ABNORMAL
SL AMB LEUKOCYTE ESTERASE,UA: ABNORMAL
SL AMB POCT BILIRUBIN,UA: ABNORMAL
SL AMB POCT BLOOD,UA: ABNORMAL
SL AMB POCT CLARITY,UA: CLEAR
SL AMB POCT COLOR,UA: YELLOW
SL AMB POCT KETONES,UA: ABNORMAL
SL AMB POCT NITRITE,UA: ABNORMAL
SL AMB POCT PH,UA: 7
SL AMB POCT SPECIFIC GRAVITY,UA: 1.01
SL AMB POCT URINE PROTEIN: ABNORMAL
SL AMB POCT UROBILINOGEN: 0.2

## 2021-01-22 PROCEDURE — 99213 OFFICE O/P EST LOW 20 MIN: CPT | Performed by: UROLOGY

## 2021-01-22 PROCEDURE — 81003 URINALYSIS AUTO W/O SCOPE: CPT | Performed by: UROLOGY

## 2021-01-22 PROCEDURE — 52000 CYSTOURETHROSCOPY: CPT | Performed by: UROLOGY

## 2021-01-22 RX ORDER — CIPROFLOXACIN 500 MG/1
500 TABLET, FILM COATED ORAL ONCE
Qty: 1 TABLET | Refills: 0 | Status: SHIPPED | OUTPATIENT
Start: 2021-01-22 | End: 2021-01-22

## 2021-01-22 RX ORDER — AZELASTINE HYDROCHLORIDE 137 UG/1
SPRAY, METERED NASAL
COMMUNITY
Start: 2021-01-14

## 2021-01-22 NOTE — PROGRESS NOTES
[address] [facility]  [facility]  [address] [facility]  [place], [address] [place] [address]  [phone]  www  [facility]  org      NAME: Stefanie Ma  AGE: 56 y o  SEX: male  : 1960   MRN: [id number]    DATE: 2021  TIME: 11:12 AM    Assessment and Plan:    Bladder cancer as below:  Repeat cystoscopy in 6 months and if this is negative we can go to yearly cystoscopies  Chief Complaint   No chief complaint on file  History of Present Illness   Bladder CA- status post TURBT of small superficial recurrences left posterior wall and right dome with normal retrogrades of mitomycin installation 2019   This was his first recurrence, and he was first diagnosed with a 5 6 cm tumor   The last pathology showed low-grade papillary urothelial carcinoma in both spots, 1 small focus of detrusor muscle seen showing no malignancy   The only BCG he has received was with the original tumor in   [facility] for surveillance cystoscopy  No new complaints  Prostate cancer screening:  PSA normal at 1 07 2020  Cystoscopy     Date/Time 2021 11:13 AM     Performed by  Alexys Jacques MD     Authorized by Alexys Jacques MD      Universal Protocol:  Consent: Verbal consent obtained  Written consent obtained  Procedure Details:  Procedure type: cystoscopy    Additional Procedure Details: Cystoscopy Procedure Note        Pre-operative Diagnosis:  Bladder cancer surveillance    Post-operative Diagnosis:  Same , no recurrence    Procedure: Flexible cystoscopy    Surgeon: Lance Chapman MD    Anesthesia: 1% Xylocaine per urethra    EBL: Minimal    Complications: none    Procedure Details   The risks, benefits, complications, treatment options, and expected outcomes were discussed with the patient  The patient concurred with the proposed plan, giving informed consent  Cystoscopy was performed today under local anesthesia, using sterile technique   The patient was placed in the supine position, prepped with Betadine, and draped in the usual sterile fashion  The flexible cystocope was used to inspect both the urethra and bladder    Findings:  Urethra: no stricture   Prostate minimally to moderately obstructive  Bladder:  Smooth, not trabeculated and there were no stones tumors or other lesions  The orifices were orthotopic and intact  No recurrence  Specimens: none                 Complications:  None           Disposition: To home            Condition:  Stable          The following portions of the patient's history were reviewed and updated as appropriate: allergies, current medications, past family history, past medical history, past social history, past surgical history and problem list   Past Medical History:   Diagnosis Date    Arthritis     Back pain     Cancer of lateral wall of urinary bladder ([address] )     Cataract, right eye     Edema     GERD (gastroesophageal reflux disease)     Hematuria, gross     Herniated lumbar intervertebral disc     Hypertension     Macular degeneration      Past Surgical History:   Procedure Laterality Date    BLADDER INSTILLATION  2015    BCG  X 6    CYSTOSCOPY  09/30/2015    W/ Irrigation of clots    FL RETROGRADE PYELOGRAM  8/7/2019    PA CYSTOURETHROSCOPY,FULGUR <0 5 CM LESN N/A 8/7/2019    Procedure: TRANSURETHRAL RESECTION OF BLADDER TUMOR (TURBT); Surgeon: Jeremías Carreon MD;  [facility];  Service: Urology    PA CYSTOURETHROSCOPY,URETER CATHETER Bilateral 8/7/2019    Procedure: CYSTOSCOPY WITH RETROGRADE PYELOGRAM;  Surgeon: Jeremías Carreon MD;  [facility];  Service: Urology    PA INSTILL ANTICANCER AGENT IN BLADDER N/A 8/7/2019    Procedure: Diane Johnson;  Surgeon: Jeremías Carreon MD;  [facility];  Service: Urology    TRANSURETHRAL RESECTION OF BLADDER TUMOR  09/2015     shoulder  Review of Systems   Review of Systems   Constitutional: Negative for fever     Respiratory: Negative for shortness of breath  Cardiovascular: Negative for chest pain  Genitourinary:        He  Occasionally has misdirected stream, which has been going on for a long time  Active Problem List     Patient Active Problem List   Diagnosis    Malignant neoplasm of posterior wall of urinary bladder (HCC)       Objective   /76   Pulse 83   Ht 6' 1" (1 854 m)   Wt 115 kg (253 lb)   BMI 33 38 kg/m²     Physical Exam  Constitutional:       Appearance: Normal appearance  HENT:      Head: Normocephalic and atraumatic  Eyes:      Extraocular Movements: Extraocular movements intact  Neck:      Musculoskeletal: Normal range of motion  Pulmonary:      Effort: Pulmonary effort is normal    Genitourinary:     Penis: Normal     Musculoskeletal: Normal range of motion  Skin:     Coloration: Skin is not jaundiced or pale  Neurological:      General: No focal deficit present  Mental Status: He is alert and oriented to person, place, and time  Psychiatric:         Mood and Affect: Mood normal          Behavior: Behavior normal          Thought Content:  Thought content normal          Judgment: Judgment normal              Current Medications     Current Outpatient Medications:     amLODIPine (NORVASC) 10 mg tablet, Take 1 tablet by mouth, Disp: , Rfl:     Azelastine HCl 137 MCG/SPRAY SOLN, , Disp: , Rfl:     ciprofloxacin (CIPRO) 500 mg tablet, TAKE 1 TABLET BY MOUTH ONCE FOR 1 DOSE TAKE PRIOR TO PROCEDURE, Disp: , Rfl:     lisinopril (ZESTRIL) 10 mg tablet, Take 10 mg by mouth daily, Disp: , Rfl:     Multiple Vitamins-Minerals (EYE VITAMINS PO), Take 1 tablet by mouth daily, Disp: , Rfl:     omeprazole (PriLOSEC OTC) 20 MG tablet, Take 20 mg by mouth as needed , Disp: , Rfl:         Deisy Lackey MD

## 2021-01-22 NOTE — LETTER
2021     Jaleel Puga DO  [address]    Patient: Diogo Gutierrez   YOB: 1960   Date of Visit: 2021       Dear Dr Maximino Weinstein:    Thank you for referring Diogo Gutierrez to me for evaluation  Below are my notes for this consultation  If you have questions, please do not hesitate to call me  I look forward to following your patient along with you  Sincerely,        Han Bartlett MD        CC: No Recipients  Han Bartlett MD  2021 11:24 AM  Sign when Signing Visit  [facility]  [address], [address] [place] [phone]  www  [facility]  org      NAME: Diogo Gutierrez  AGE: 56 y o  SEX: male  : 1960   MRN: [id number]    DATE: 2021  TIME: 11:12 AM    Assessment and Plan:    Bladder cancer as below:  Repeat cystoscopy in 6 months and if this is negative we can go to yearly cystoscopies  Chief Complaint   No chief complaint on file  History of Present Illness   Bladder CA- status post TURBT of small superficial recurrences left posterior wall and right dome with normal retrogrades of mitomycin installation 2019   This was his first recurrence, and he was first diagnosed with a 5 6 cm tumor   The last pathology showed low-grade papillary urothelial carcinoma in both spots, 1 small focus of detrusor muscle seen showing no malignancy   The only BCG he has received was with the original tumor in   [facility] for surveillance cystoscopy  No new complaints  Prostate cancer screening:  PSA normal at 1 07 2020  Cystoscopy     Date/Time 2021 11:13 AM     Performed by  Han Bartlett MD     Authorized by Han Bartlett MD      Universal Protocol:  Consent: Verbal consent obtained  Written consent obtained        Procedure Details:  Procedure type: cystoscopy    Additional Procedure Details: Cystoscopy Procedure Note        Pre-operative Diagnosis:  Bladder cancer surveillance    Post-operative Diagnosis:  Same , no recurrence    Procedure: Flexible cystoscopy    Surgeon: Lisandra Orellana MD    Anesthesia: 1% Xylocaine per urethra    EBL: Minimal    Complications: none    Procedure Details   The risks, benefits, complications, treatment options, and expected outcomes were discussed with the patient  The patient concurred with the proposed plan, giving informed consent  Cystoscopy was performed today under local anesthesia, using sterile technique  The patient was placed in the supine position, prepped with Betadine, and draped in the usual sterile fashion  The flexible cystocope was used to inspect both the urethra and bladder    Findings:  Urethra: no stricture   Prostate minimally to moderately obstructive  Bladder:  Smooth, not trabeculated and there were no stones tumors or other lesions  The orifices were orthotopic and intact  No recurrence  Specimens: none                 Complications:  None           Disposition: To home            Condition:  Stable          The following portions of the patient's history were reviewed and updated as appropriate: allergies, current medications, past family history, past medical history, past social history, past surgical history and problem list   Past Medical History:   Diagnosis Date    Arthritis     Back pain     Cancer of lateral wall of urinary bladder ([address] )     Cataract, right eye     Edema     GERD (gastroesophageal reflux disease)     Hematuria, gross     Herniated lumbar intervertebral disc     Hypertension     Macular degeneration      Past Surgical History:   Procedure Laterality Date    BLADDER INSTILLATION  2015    BCG  X 6    CYSTOSCOPY  09/30/2015    W/ Irrigation of clots    FL RETROGRADE PYELOGRAM  8/7/2019    IN CYSTOURETHROSCOPY,FULGUR <0 5 CM LESN N/A 8/7/2019    Procedure: TRANSURETHRAL RESECTION OF BLADDER TUMOR (TURBT);   Surgeon: Sedrick Riggins MD;  [facility];  Service: Urology    NV CYSTOURETHROSCOPY,URETER CATHETER Bilateral 8/7/2019    Procedure: CYSTOSCOPY WITH RETROGRADE PYELOGRAM;  Surgeon: Sedrick Riggins MD;  [facility];  Service: Urology    NV INSTILL ANTICANCER AGENT IN BLADDER N/A 8/7/2019    Procedure: Zafar Bocanegra;  Surgeon: Sedrick Riggins MD;  [facility];  Service: Urology    TRANSURETHRAL RESECTION OF BLADDER TUMOR  09/2015     shoulder  Review of Systems   Review of Systems   Constitutional: Negative for fever  Respiratory: Negative for shortness of breath  Cardiovascular: Negative for chest pain  Genitourinary:        He  Occasionally has misdirected stream, which has been going on for a long time  Active Problem List     Patient Active Problem List   Diagnosis    Malignant neoplasm of posterior wall of urinary bladder (HCC)       Objective   /76   Pulse 83   Ht 6' 1" (1 854 m)   Wt 115 kg (253 lb)   BMI 33 38 kg/m²     Physical Exam  Constitutional:       Appearance: Normal appearance  HENT:      Head: Normocephalic and atraumatic  Eyes:      Extraocular Movements: Extraocular movements intact  Neck:      Musculoskeletal: Normal range of motion  Pulmonary:      Effort: Pulmonary effort is normal    Genitourinary:     Penis: Normal     Musculoskeletal: Normal range of motion  Skin:     Coloration: Skin is not jaundiced or pale  Neurological:      General: No focal deficit present  Mental Status: He is alert and oriented to person, place, and time  Psychiatric:         Mood and Affect: Mood normal          Behavior: Behavior normal          Thought Content:  Thought content normal          Judgment: Judgment normal              Current Medications     Current Outpatient Medications:     amLODIPine (NORVASC) 10 mg tablet, Take 1 tablet by mouth, Disp: , Rfl:     Azelastine HCl 137 MCG/SPRAY SOLN, , Disp: , Rfl:     ciprofloxacin (CIPRO) 500 mg tablet, TAKE 1 TABLET BY MOUTH ONCE FOR 1 DOSE TAKE PRIOR TO PROCEDURE, Disp: , Rfl:     lisinopril (ZESTRIL) 10 mg tablet, Take 10 mg by mouth daily, Disp: , Rfl:     Multiple Vitamins-Minerals (EYE VITAMINS PO), Take 1 tablet by mouth daily, Disp: , Rfl:     omeprazole (PriLOSEC OTC) 20 MG tablet, Take 20 mg by mouth as needed , Disp: , Rfl:         Soo Edward MD

## 2021-07-27 ENCOUNTER — PROCEDURE VISIT (OUTPATIENT)
Dept: UROLOGY | Facility: CLINIC | Age: 61
End: 2021-07-27
Payer: MEDICARE

## 2021-07-27 VITALS
WEIGHT: 252.65 LBS | DIASTOLIC BLOOD PRESSURE: 70 MMHG | SYSTOLIC BLOOD PRESSURE: 126 MMHG | HEART RATE: 72 BPM | BODY MASS INDEX: 33.33 KG/M2

## 2021-07-27 DIAGNOSIS — C67.2 MALIGNANT NEOPLASM OF LATERAL WALL OF URINARY BLADDER (HCC): Primary | ICD-10-CM

## 2021-07-27 DIAGNOSIS — Z12.5 ENCOUNTER FOR PROSTATE CANCER SCREENING: ICD-10-CM

## 2021-07-27 PROCEDURE — 52000 CYSTOURETHROSCOPY: CPT | Performed by: UROLOGY

## 2021-07-27 PROCEDURE — 99213 OFFICE O/P EST LOW 20 MIN: CPT | Performed by: UROLOGY

## 2021-07-27 RX ORDER — CIPROFLOXACIN 500 MG/1
500 TABLET, FILM COATED ORAL ONCE
Qty: 1 TABLET | Refills: 0 | Status: SHIPPED | OUTPATIENT
Start: 2021-07-27 | End: 2021-07-27

## 2021-07-27 NOTE — PROGRESS NOTES
[address] [facility]  [facility]  [address] [facility]  [place], [address] [place] [address]  [phone]  www  [facility]  org      NAME: Dimitri Peña  AGE: 59 y o  SEX: male  : 1960   MRN: [id number]    DATE: 2021  TIME: 1:22 PM    Assessment and Plan:    Bladder cancer:  No recurrence, last recurrence was 2019  Follow-up 1 year for cystoscopy  Prostate cancer screening:  Last PSA  was 1 07, check this in September and I will send him the results  Chief Complaint     Chief Complaint   Patient presents with    Cystoscopy       History of Present Illness   Bladder CA- status post TURBT of small superficial recurrences left posterior wall and right dome with normal retrogrades of mitomycin installation 2019   This was his first recurrence, and he was first diagnosed with a 5 6 cm tumor   The last pathology showed low-grade papillary urothelial carcinoma in both spots, 1 small focus of detrusor muscle seen showing no malignancy   The only BCG he has received was with the original tumor in   [facility] for surveillance cystoscopy  No new complaints  Last cystoscopy was 2021  Cystoscopy     Date/Time 2021 1:00 PM     Performed by  Jem Mora MD     Authorized by Jem Mora MD      Universal Protocol:  Consent: Verbal consent obtained  Written consent obtained  Procedure Details:  Procedure type: cystoscopy    Additional Procedure Details: Cystoscopy Procedure Note        Pre-operative Diagnosis:  Bladder cancer surveillance    Post-operative Diagnosis:  same, no recurrence    Procedure: Flexible cystoscopy    Surgeon: Patricia Wong MD    Anesthesia: 1% Xylocaine per urethra    EBL: Minimal    Complications: none    Procedure Details   The risks, benefits, complications, treatment options, and expected outcomes were discussed with the patient   The patient concurred with the proposed plan, giving informed consent  Cystoscopy was performed today under local anesthesia, using sterile technique  The patient was placed in the supine position, prepped with Betadine, and draped in the usual sterile fashion  The flexible cystocope was used to inspect both the urethra and bladder    Findings:  Urethra: Without stricture  Prostate minimally occlusive  Bladder:  Smooth, not trabeculated and there were no stones tumors or other lesions  The orifices were orthotopic and intact  No recurrence           Specimens:   None                 Complications:  None           Disposition: To home            Condition:  Stable          The following portions of the patient's history were reviewed and updated as appropriate: allergies, current medications, past family history, past medical history, past social history, past surgical history and problem list   Past Medical History:   Diagnosis Date    Arthritis     Back pain     Cancer of lateral wall of urinary bladder ([address] )     Cataract, right eye     Edema     GERD (gastroesophageal reflux disease)     Hematuria, gross     Herniated lumbar intervertebral disc     Hypertension     Macular degeneration      Past Surgical History:   Procedure Laterality Date    BLADDER INSTILLATION  2015    BCG  X 6    CYSTOSCOPY  09/30/2015    W/ Irrigation of clots    FL RETROGRADE PYELOGRAM  8/7/2019    MT CYSTOURETHROSCOPY,FULGUR <0 5 CM LESN N/A 8/7/2019    Procedure: TRANSURETHRAL RESECTION OF BLADDER TUMOR (TURBT);   Surgeon: Corina Dougherty MD;  [facility];  Service: Urology    MT CYSTOURETHROSCOPY,URETER CATHETER Bilateral 8/7/2019    Procedure: CYSTOSCOPY WITH RETROGRADE PYELOGRAM;  Surgeon: Corina Dougherty MD;  [facility];  Service: Urology    MT INSTILL ANTICANCER AGENT IN BLADDER N/A 8/7/2019    Procedure: INSTILLATION MITOMYCIN;  Surgeon: Corina Dougherty MD;  [facility];  Service: Urology    TRANSURETHRAL RESECTION OF BLADDER TUMOR  09/2015 shoulder  Review of Systems   Review of Systems    Active Problem List     Patient Active Problem List   Diagnosis    Malignant neoplasm of posterior wall of urinary bladder (HCC)       Objective   /70   Pulse 72   Wt 115 kg (252 lb 10 4 oz)   BMI 33 33 kg/m²     Physical Exam  Constitutional:       Appearance: Normal appearance  HENT:      Head: Normocephalic and atraumatic  Pulmonary:      Effort: Pulmonary effort is normal    Genitourinary:     Penis: Normal     Musculoskeletal:         General: No swelling  Normal range of motion  Cervical back: Normal range of motion  Skin:     Coloration: Skin is not jaundiced or pale  Neurological:      General: No focal deficit present  Mental Status: He is alert and oriented to person, place, and time  Mental status is at baseline  Psychiatric:         Mood and Affect: Mood normal          Behavior: Behavior normal          Thought Content:  Thought content normal          Judgment: Judgment normal              Current Medications     Current Outpatient Medications:     amLODIPine (NORVASC) 10 mg tablet, Take 1 tablet by mouth, Disp: , Rfl:     Azelastine HCl 137 MCG/SPRAY SOLN, , Disp: , Rfl:     ciprofloxacin (CIPRO) 500 mg tablet, TAKE 1 TABLET BY MOUTH ONCE FOR 1 DOSE TAKE PRIOR TO PROCEDURE, Disp: , Rfl:     ciprofloxacin (Cipro) 500 mg tablet, Take 1 tablet (500 mg total) by mouth once for 1 dose Take 1 dose after  procedure, Disp: 1 tablet, Rfl: 0    lisinopril (ZESTRIL) 10 mg tablet, Take 10 mg by mouth daily, Disp: , Rfl:     Multiple Vitamins-Minerals (EYE VITAMINS PO), Take 1 tablet by mouth daily, Disp: , Rfl:     omeprazole (PriLOSEC OTC) 20 MG tablet, Take 20 mg by mouth as needed , Disp: , Rfl:         Hiwot Benites MD

## 2021-07-27 NOTE — LETTER
2021     Yuan Ramos, DO   [facility]  [phone]    Patient: Jose Gates   YOB: 1960   Date of Visit: 2021       Dear Dr Henry Chapin:    Thank you for referring Jose Gates to me for evaluation  Below are my notes for this consultation  If you have questions, please do not hesitate to call me  I look forward to following your patient along with you  Sincerely,        Jennifer Fink MD        CC: No Recipients  Jennifer Fink MD  2021  1:24 PM  Sign when Signing Visit  [address] [facility]  [address], [address] [place] [phone]  www  [facility]  org      NAME: Jose Gates  AGE: 59 y o  SEX: male  : 1960   MRN: [id number]    DATE: 2021  TIME: 1:22 PM    Assessment and Plan:    Bladder cancer:  No recurrence, last recurrence was 2019  Follow-up 1 year for cystoscopy  Prostate cancer screening:  Last PSA  was 1 07, check this in September and I will send him the results  Chief Complaint     Chief Complaint   Patient presents with    Cystoscopy       History of Present Illness   Bladder CA- status post TURBT of small superficial recurrences left posterior wall and right dome with normal retrogrades of mitomycin installation 2019   This was his first recurrence, and he was first diagnosed with a 5 6 cm tumor   The last pathology showed low-grade papillary urothelial carcinoma in both spots, 1 small focus of detrusor muscle seen showing no malignancy   The only BCG he has received was with the original tumor in   [facility] for surveillance cystoscopy  No new complaints  Last cystoscopy was 2021  Cystoscopy     Date/Time 2021 1:00 PM     Performed by  Jennifer Fink MD     Authorized by Jennifer Fink MD      Universal Protocol:  Consent: Verbal consent obtained  Written consent obtained        Procedure Spoke to patient. Patient informed to he will need to be seen. Patient states he will give it a few more days to see if symptoms improve. Patient declined to come in today. Patient to f/u Monday. Details:  Procedure type: cystoscopy    Additional Procedure Details: Cystoscopy Procedure Note        Pre-operative Diagnosis:  Bladder cancer surveillance    Post-operative Diagnosis:  same, no recurrence    Procedure: Flexible cystoscopy    Surgeon: Abhi Murillo MD    Anesthesia: 1% Xylocaine per urethra    EBL: Minimal    Complications: none    Procedure Details   The risks, benefits, complications, treatment options, and expected outcomes were discussed with the patient  The patient concurred with the proposed plan, giving informed consent  Cystoscopy was performed today under local anesthesia, using sterile technique  The patient was placed in the supine position, prepped with Betadine, and draped in the usual sterile fashion  The flexible cystocope was used to inspect both the urethra and bladder    Findings:  Urethra: Without stricture  Prostate minimally occlusive  Bladder:  Smooth, not trabeculated and there were no stones tumors or other lesions  The orifices were orthotopic and intact    No recurrence           Specimens:   None                 Complications:  None           Disposition: To home            Condition:  Stable          The following portions of the patient's history were reviewed and updated as appropriate: allergies, current medications, past family history, past medical history, past social history, past surgical history and problem list   Past Medical History:   Diagnosis Date    Arthritis     Back pain     Cancer of lateral wall of urinary bladder ([address] )     Cataract, right eye     Edema     GERD (gastroesophageal reflux disease)     Hematuria, gross     Herniated lumbar intervertebral disc     Hypertension     Macular degeneration      Past Surgical History:   Procedure Laterality Date    BLADDER INSTILLATION  2015    BCG  X 6    CYSTOSCOPY  09/30/2015    W/ Irrigation of clots    FL RETROGRADE PYELOGRAM  8/7/2019    FL CYSTOURETHROSCOPY,FULGUR <0 5 CM PATRICIA N/A 8/7/2019    Procedure: TRANSURETHRAL RESECTION OF BLADDER TUMOR (TURBT); Surgeon: Mariola Bowman MD;  [facility];  Service: Urology    MO CYSTOURETHROSCOPY,URETER CATHETER Bilateral 8/7/2019    Procedure: CYSTOSCOPY WITH RETROGRADE PYELOGRAM;  Surgeon: Mariola Bowman MD;  [facility];  Service: Urology    MO INSTILL ANTICANCER AGENT IN BLADDER N/A 8/7/2019    Procedure: Caitytamy Hiawatha;  Surgeon: Mariola Bowman MD;  [facility];  Service: Urology    TRANSURETHRAL RESECTION OF BLADDER TUMOR  09/2015     shoulder  Review of Systems   Review of Systems    Active Problem List     Patient Active Problem List   Diagnosis    Malignant neoplasm of posterior wall of urinary bladder (HCC)       Objective   /70   Pulse 72   Wt 115 kg (252 lb 10 4 oz)   BMI 33 33 kg/m²     Physical Exam  Constitutional:       Appearance: Normal appearance  HENT:      Head: Normocephalic and atraumatic  Pulmonary:      Effort: Pulmonary effort is normal    Genitourinary:     Penis: Normal     Musculoskeletal:         General: No swelling  Normal range of motion  Cervical back: Normal range of motion  Skin:     Coloration: Skin is not jaundiced or pale  Neurological:      General: No focal deficit present  Mental Status: He is alert and oriented to person, place, and time  Mental status is at baseline  Psychiatric:         Mood and Affect: Mood normal          Behavior: Behavior normal          Thought Content:  Thought content normal          Judgment: Judgment normal              Current Medications     Current Outpatient Medications:     amLODIPine (NORVASC) 10 mg tablet, Take 1 tablet by mouth, Disp: , Rfl:     Azelastine HCl 137 MCG/SPRAY SOLN, , Disp: , Rfl:     ciprofloxacin (CIPRO) 500 mg tablet, TAKE 1 TABLET BY MOUTH ONCE FOR 1 DOSE TAKE PRIOR TO PROCEDURE, Disp: , Rfl:     ciprofloxacin (Cipro) 500 mg tablet, Take 1 tablet (500 mg total) by mouth once for 1 dose Take 1 dose after  procedure, Disp: 1 tablet, Rfl: 0    lisinopril (ZESTRIL) 10 mg tablet, Take 10 mg by mouth daily, Disp: , Rfl:     Multiple Vitamins-Minerals (EYE VITAMINS PO), Take 1 tablet by mouth daily, Disp: , Rfl:     omeprazole (PriLOSEC OTC) 20 MG tablet, Take 20 mg by mouth as needed , Disp: , Rfl:         Mary Puga MD

## 2021-11-02 ENCOUNTER — TELEPHONE (OUTPATIENT)
Dept: UROLOGY | Facility: AMBULATORY SURGERY CENTER | Age: 61
End: 2021-11-02

## 2022-07-27 ENCOUNTER — PROCEDURE VISIT (OUTPATIENT)
Dept: UROLOGY | Facility: CLINIC | Age: 62
End: 2022-07-27
Payer: MEDICARE

## 2022-07-27 VITALS
WEIGHT: 245 LBS | BODY MASS INDEX: 32.32 KG/M2 | DIASTOLIC BLOOD PRESSURE: 68 MMHG | HEART RATE: 68 BPM | SYSTOLIC BLOOD PRESSURE: 126 MMHG

## 2022-07-27 DIAGNOSIS — Z12.5 ENCOUNTER FOR PROSTATE CANCER SCREENING: ICD-10-CM

## 2022-07-27 DIAGNOSIS — C67.2 MALIGNANT NEOPLASM OF LATERAL WALL OF URINARY BLADDER (HCC): Primary | ICD-10-CM

## 2022-07-27 PROCEDURE — 52000 CYSTOURETHROSCOPY: CPT | Performed by: UROLOGY

## 2022-07-27 PROCEDURE — 99213 OFFICE O/P EST LOW 20 MIN: CPT | Performed by: UROLOGY

## 2022-07-27 RX ORDER — CIPROFLOXACIN 500 MG/1
500 TABLET, FILM COATED ORAL ONCE
Qty: 1 TABLET | Refills: 0 | Status: SHIPPED | OUTPATIENT
Start: 2022-07-27 | End: 2022-07-27

## 2022-07-27 NOTE — PROGRESS NOTES
[address] [facility]  [facility]  [address] [facility]  [place], [address] [place] [address]  [phone]  www  [facility]  org      NAME: Venkat De La Torre  AGE: 57 y o  SEX: male  : 1960   MRN: [id number]    DATE: 2022  TIME: 9:19 AM    Assessment and Plan:    Bladder cancer:      Prostate cancer screening:               Chief Complaint   No chief complaint on file  History of Present Illness    Bladder CA- status post TURBT of small superficial recurrences left posterior wall and right dome with normal retrogrades of mitomycin installation 2019   This was his first recurrence, and he was first diagnosed with a 5 6 cm tumor   The last pathology showed low-grade papillary urothelial carcinoma in both spots, 1 small focus of detrusor muscle seen showing no malignancy   The only BCG he has received was with the original tumor in   [facility] for surveillance cystoscopy   No new complaints  Last cystoscopy was 2021  Recently started fast walking on treadmill  Has lost 12 lb  Prostate cancer screening:  PSA 1 21 10/12/2021  Cystoscopy     Date/Time 2022 8:11 AM     Performed by  Leslie Boggs MD     Authorized by Leslie Boggs MD      Universal Protocol:  Consent: Verbal consent obtained  Written consent obtained  Procedure Details:  Procedure type: cystoscopy    Additional Procedure Details: Cystoscopy Procedure Note        Pre-operative Diagnosis:  Bladder cancer surveillance    Post-operative Diagnosis:  Same    Procedure: Flexible cystoscopy    Surgeon: Margarita Richards MD    Anesthesia: 1% Xylocaine per urethra    EBL: Minimal    Complications: none    Procedure Details   The risks, benefits, complications, treatment options, and expected outcomes were discussed with the patient  The patient concurred with the proposed plan, giving informed consent  Cystoscopy was performed today under local anesthesia, using sterile technique   The patient was placed in the supine position, prepped with Betadine, and draped in the usual sterile fashion  The flexible cystocope was used to inspect both the urethra and bladder    Findings:  Urethra:    Bladder:  Smooth, not trabeculated and there were no stones tumors or other lesions  The orifices were orthotopic and intact  Specimens: none                 Complications:  None           Disposition: To home            Condition:  Stable          The following portions of the patient's history were reviewed and updated as appropriate: allergies, current medications, past family history, past medical history, past social history, past surgical history and problem list   Past Medical History:   Diagnosis Date    Arthritis     Back pain     Cancer of lateral wall of urinary bladder ([address] )     Cataract, right eye     Edema     GERD (gastroesophageal reflux disease)     Hematuria, gross     Herniated lumbar intervertebral disc     Hypertension     Macular degeneration      Past Surgical History:   Procedure Laterality Date    BLADDER INSTILLATION  2015    BCG  X 6    CYSTOSCOPY  09/30/2015    W/ Irrigation of clots    FL RETROGRADE PYELOGRAM  8/7/2019    NE CYSTOURETHROSCOPY,FULGUR <0 5 CM LESN N/A 8/7/2019    Procedure: TRANSURETHRAL RESECTION OF BLADDER TUMOR (TURBT); Surgeon: Alessandro Ni MD;  [facility];  Service: Urology    NE CYSTOURETHROSCOPY,URETER CATHETER Bilateral 8/7/2019    Procedure: CYSTOSCOPY WITH RETROGRADE PYELOGRAM;  Surgeon: Alessandro Ni MD;  [facility];  Service: Urology    NE INSTILL ANTICANCER AGENT IN BLADDER N/A 8/7/2019    Procedure: Imelda Hair;  Surgeon: Alessandro Ni MD;  [facility];  Service: Urology    TRANSURETHRAL RESECTION OF BLADDER TUMOR  09/2015     shoulder  Review of Systems   Review of Systems   Constitutional: Negative for fever  Respiratory: Negative for shortness of breath      Cardiovascular: Negative for chest pain    Genitourinary: Negative  Active Problem List     Patient Active Problem List   Diagnosis    Malignant neoplasm of posterior wall of urinary bladder (HCC)       Objective   /68   Pulse 68   Wt 111 kg (245 lb)   BMI 32 32 kg/m²     Physical Exam  Vitals reviewed  Constitutional:       Appearance: Normal appearance  HENT:      Head: Normocephalic and atraumatic  Eyes:      Extraocular Movements: Extraocular movements intact  Pulmonary:      Effort: Pulmonary effort is normal    Abdominal:      Palpations: Abdomen is soft  Genitourinary:     Penis: Normal     Musculoskeletal:         General: Normal range of motion  Cervical back: Normal range of motion  Skin:     Coloration: Skin is not jaundiced or pale  Neurological:      General: No focal deficit present  Mental Status: He is alert and oriented to person, place, and time  Mental status is at baseline  Psychiatric:         Mood and Affect: Mood normal          Behavior: Behavior normal          Thought Content:  Thought content normal          Judgment: Judgment normal              Current Medications     Current Outpatient Medications:     amLODIPine (NORVASC) 10 mg tablet, Take 1 tablet by mouth, Disp: , Rfl:     Azelastine HCl 137 MCG/SPRAY SOLN, , Disp: , Rfl:     ciprofloxacin (CIPRO) 500 mg tablet, TAKE 1 TABLET BY MOUTH ONCE FOR 1 DOSE TAKE PRIOR TO PROCEDURE, Disp: , Rfl:     ciprofloxacin (Cipro) 500 mg tablet, Take 1 tablet (500 mg total) by mouth once for 1 dose Take 1 dose after  procedure, Disp: 1 tablet, Rfl: 0    lisinopril (ZESTRIL) 10 mg tablet, Take 10 mg by mouth daily (Patient not taking: Reported on 7/27/2022), Disp: , Rfl:     Multiple Vitamins-Minerals (EYE VITAMINS PO), Take 1 tablet by mouth daily (Patient not taking: Reported on 7/27/2022), Disp: , Rfl:     omeprazole (PriLOSEC OTC) 20 MG tablet, Take 20 mg by mouth as needed  (Patient not taking: Reported on 7/27/2022), Disp: , Rfl: Yoan Crowe MD

## 2022-07-27 NOTE — LETTER
2022     Lajuanda Osgood, DO  1978 [address]    Patient: Olivia Salinas   YOB: 1960   Date of Visit: 2022       Dear Dr Jeanie Artis:    Thank you for referring Olivia Salinas to me for evaluation  Below are my notes for this consultation  If you have questions, please do not hesitate to call me  I look forward to following your patient along with you  Sincerely,        Sandi Scott MD        CC: No Recipients  Sandi Scott MD  2022  9:25 AM  Sign when Signing Visit  [address] [facility]  [address], [address] [place] [phone]  www  [email or web address]  [email or web address]      NAME: Olivia Salinas  AGE: 57 y o  SEX: male  : 1960   MRN: [id number]    DATE: 2022  TIME: 9:19 AM    Assessment and Plan:    Bladder cancer:      Prostate cancer screening:               Chief Complaint   No chief complaint on file  History of Present Illness    Bladder CA- status post TURBT of small superficial recurrences left posterior wall and right dome with normal retrogrades of mitomycin installation 2019   This was his first recurrence, and he was first diagnosed with a 5 6 cm tumor   The last pathology showed low-grade papillary urothelial carcinoma in both spots, 1 small focus of detrusor muscle seen showing no malignancy   The only BCG he has received was with the original tumor in   [facility] Bui for surveillance cystoscopy   No new complaints  Last cystoscopy was 2021  Recently started fast walking on treadmill  Has lost 12 lb  Prostate cancer screening:  PSA 1 21 10/12/2021  Cystoscopy     Date/Time 2022 8:11 AM     Performed by  Sandi Scott MD     Authorized by Sandi Scott MD      Universal Protocol:  Consent: Verbal consent obtained  Written consent obtained        Procedure Details:  Procedure type: cystoscopy    Additional Procedure Details: Cystoscopy Procedure Note        Pre-operative Diagnosis:  Bladder cancer surveillance    Post-operative Diagnosis:  Same    Procedure: Flexible cystoscopy    Surgeon: Margarita Richards MD    Anesthesia: 1% Xylocaine per urethra    EBL: Minimal    Complications: none    Procedure Details   The risks, benefits, complications, treatment options, and expected outcomes were discussed with the patient  The patient concurred with the proposed plan, giving informed consent  Cystoscopy was performed today under local anesthesia, using sterile technique  The patient was placed in the supine position, prepped with Betadine, and draped in the usual sterile fashion  The flexible cystocope was used to inspect both the urethra and bladder    Findings:  Urethra:    Bladder:  Smooth, not trabeculated and there were no stones tumors or other lesions  The orifices were orthotopic and intact  Specimens: none                 Complications:  None           Disposition: To home            Condition:  Stable          The following portions of the patient's history were reviewed and updated as appropriate: allergies, current medications, past family history, past medical history, past social history, past surgical history and problem list   Past Medical History:   Diagnosis Date    Arthritis     Back pain     Cancer of lateral wall of urinary bladder ([address] )     Cataract, right eye     Edema     GERD (gastroesophageal reflux disease)     Hematuria, gross     Herniated lumbar intervertebral disc     Hypertension     Macular degeneration      Past Surgical History:   Procedure Laterality Date    BLADDER INSTILLATION  2015    BCG  X 6    CYSTOSCOPY  09/30/2015    W/ Irrigation of clots    FL RETROGRADE PYELOGRAM  8/7/2019    GA CYSTOURETHROSCOPY,FULGUR <0 5 CM LESN N/A 8/7/2019    Procedure: TRANSURETHRAL RESECTION OF BLADDER TUMOR (TURBT);   Surgeon: Leslie Boggs MD;  [facility];  Service: Urology    GA CYSTOURETHROSCOPY,URETER CATHETER Bilateral 8/7/2019    Procedure: CYSTOSCOPY WITH RETROGRADE PYELOGRAM;  Surgeon: Felicitas Guerrero MD;  [facility];  Service: Urology    MS INSTILL ANTICANCER AGENT IN BLADDER N/A 8/7/2019    Procedure: INSTILLATION MITOMYCIN;  Surgeon: Felicitas Guerrero MD;  [facility];  Service: Urology    TRANSURETHRAL RESECTION OF BLADDER TUMOR  09/2015     shoulder  Review of Systems   Review of Systems   Constitutional: Negative for fever  Respiratory: Negative for shortness of breath  Cardiovascular: Negative for chest pain  Genitourinary: Negative  Active Problem List     Patient Active Problem List   Diagnosis    Malignant neoplasm of posterior wall of urinary bladder (HCC)       Objective   /68   Pulse 68   Wt 111 kg (245 lb)   BMI 32 32 kg/m²     Physical Exam  Vitals reviewed  Constitutional:       Appearance: Normal appearance  HENT:      Head: Normocephalic and atraumatic  Eyes:      Extraocular Movements: Extraocular movements intact  Pulmonary:      Effort: Pulmonary effort is normal    Abdominal:      Palpations: Abdomen is soft  Genitourinary:     Penis: Normal     Musculoskeletal:         General: Normal range of motion  Cervical back: Normal range of motion  Skin:     Coloration: Skin is not jaundiced or pale  Neurological:      General: No focal deficit present  Mental Status: He is alert and oriented to person, place, and time  Mental status is at baseline  Psychiatric:         Mood and Affect: Mood normal          Behavior: Behavior normal          Thought Content:  Thought content normal          Judgment: Judgment normal              Current Medications     Current Outpatient Medications:     amLODIPine (NORVASC) 10 mg tablet, Take 1 tablet by mouth, Disp: , Rfl:     Azelastine HCl 137 MCG/SPRAY SOLN, , Disp: , Rfl:     ciprofloxacin (CIPRO) 500 mg tablet, TAKE 1 TABLET BY MOUTH ONCE FOR 1 DOSE TAKE PRIOR TO PROCEDURE, Disp: , Rfl:    ciprofloxacin (Cipro) 500 mg tablet, Take 1 tablet (500 mg total) by mouth once for 1 dose Take 1 dose after  procedure, Disp: 1 tablet, Rfl: 0    lisinopril (ZESTRIL) 10 mg tablet, Take 10 mg by mouth daily (Patient not taking: Reported on 7/27/2022), Disp: , Rfl:     Multiple Vitamins-Minerals (EYE VITAMINS PO), Take 1 tablet by mouth daily (Patient not taking: Reported on 7/27/2022), Disp: , Rfl:     omeprazole (PriLOSEC OTC) 20 MG tablet, Take 20 mg by mouth as needed  (Patient not taking: Reported on 7/27/2022), Disp: , Rfl:         Viona Peabody, MD

## 2022-07-27 NOTE — TELEPHONE ENCOUNTER
Medication Refill Request     Name ciprofloxacin (CIPRO) 500 mg tablet  Dose/Frequency TAKE 1 TABLET BY MOUTH ONCE FOR 1 DOSE TAKE PRIOR TO PROCEDURE  Quantity 1  Verified pharmacy   [x]  Verified ordering Provider   [x]  Does patient have enough for the next 3 days? Yes [] No [x]    This medication was sent to the wrong pharmacy

## 2023-07-13 ENCOUNTER — OFFICE VISIT (OUTPATIENT)
Dept: UROLOGY | Facility: CLINIC | Age: 63
End: 2023-07-13
Payer: MEDICARE

## 2023-07-13 VITALS
WEIGHT: 255 LBS | DIASTOLIC BLOOD PRESSURE: 84 MMHG | SYSTOLIC BLOOD PRESSURE: 120 MMHG | HEIGHT: 73 IN | OXYGEN SATURATION: 95 % | HEART RATE: 102 BPM | BODY MASS INDEX: 33.8 KG/M2

## 2023-07-13 DIAGNOSIS — C67.2 MALIGNANT NEOPLASM OF LATERAL WALL OF URINARY BLADDER (HCC): Primary | ICD-10-CM

## 2023-07-13 DIAGNOSIS — Z12.5 ENCOUNTER FOR PROSTATE CANCER SCREENING: ICD-10-CM

## 2023-07-13 PROCEDURE — 99213 OFFICE O/P EST LOW 20 MIN: CPT

## 2023-07-13 NOTE — PROGRESS NOTES
7/13/2023    Chief Complaint   Patient presents with   • Follow-up     1 yr f/u        Assessment and Plan    70 y.o. male manage by Dr. Clement Stack    1. Bladder cancer  · Last surveillance cystoscopy was 7/2022 which was negative for recurrence. · Last recurrence was August 2019. · Will schedule him for surveillance cystoscopy with Dr. Clement Stack. 2.  Prostate cancer screening  · PSA 0.96 (6/26/2023)  · LUPE deferred today  · Follow-up 1 year with PSA          Subjective:      He presents today reporting doing very well since the last time we saw him. He denies any significant voiding complaints other than intermittent nocturia 1-3 times per night. He feels his stream is strong and denies any frequency. He denies any gross hematuria or dysuria. History of Present Illness  Joy Damon is a 70 y.o. male here for annual follow-up evaluation of bladder cancer and prostate cancer screening. Patient of Dr. Alireza Iglesias with history of bladder cancer status post TURBT of small superficial recurrence left posterior wall and right dome with normal retrogrades on mitomycin instillation 8/7/2019. This was his first recurrence, and he was first diagnosed with a 5.6 cm tumor 2015. The last pathology showed low-grade papillary urothelial carcinoma in both spots, 1 small focus of detrusor muscle seen showing no malignancy.  The only BCG he has received was with the original tumor in 2015. Surveillance cystoscopy from 7/27/2022 showed smooth, not trabeculated bladder and there were no stones tumors or other lesions. Both ureteral orifices were orthotopic and intact. Prostate cancer screening: PSA 0.96 as of 6/26/2023            Review of Systems   Constitutional: Negative for chills and fever. HENT: Negative for congestion and sore throat. Respiratory: Negative for cough and shortness of breath. Cardiovascular: Negative for chest pain and leg swelling.    Gastrointestinal: Negative for abdominal pain, constipation and diarrhea. Genitourinary: Negative for difficulty urinating, dysuria, frequency, hematuria and urgency. Musculoskeletal: Negative for back pain and gait problem. Skin: Negative for wound. Allergic/Immunologic: Negative for immunocompromised state. Hematological: Does not bruise/bleed easily. Vitals  Vitals:    07/13/23 0945   BP: 120/84   BP Location: Left arm   Patient Position: Sitting   Cuff Size: Adult   Pulse: 102   SpO2: 95%   Weight: 116 kg (255 lb)   Height: 6' 1" (1.854 m)       Physical Exam  Vitals reviewed. Constitutional:       General: He is not in acute distress. Appearance: Normal appearance. He is not ill-appearing or toxic-appearing. HENT:      Head: Normocephalic and atraumatic. Eyes:      General: No scleral icterus. Conjunctiva/sclera: Conjunctivae normal.   Cardiovascular:      Rate and Rhythm: Normal rate. Pulmonary:      Effort: Pulmonary effort is normal. No respiratory distress. Abdominal:      Tenderness: There is no right CVA tenderness or left CVA tenderness. Hernia: No hernia is present. Musculoskeletal:      Cervical back: Normal range of motion. Right lower leg: No edema. Left lower leg: No edema. Skin:     General: Skin is warm and dry. Coloration: Skin is not jaundiced or pale. Neurological:      General: No focal deficit present. Mental Status: He is alert and oriented to person, place, and time. Mental status is at baseline. Gait: Gait normal.   Psychiatric:         Mood and Affect: Mood normal.         Behavior: Behavior normal.         Thought Content:  Thought content normal.         Judgment: Judgment normal.         Past History  Past Medical History:   Diagnosis Date   • Arthritis    • Back pain    • Cancer of lateral wall of urinary bladder (HCC)    • Cataract, right eye    • Edema    • GERD (gastroesophageal reflux disease)    • Hematuria, gross    • Herniated lumbar intervertebral disc    • Hypertension    • Macular degeneration      Social History     Socioeconomic History   • Marital status:      Spouse name: None   • Number of children: None   • Years of education: None   • Highest education level: None   Occupational History   • None   Tobacco Use   • Smoking status: Former     Packs/day: 2.00     Types: Cigarettes     Quit date: 2015     Years since quittin.9   • Smokeless tobacco: Current   • Tobacco comments:     occ vape   Vaping Use   • Vaping Use: Never used   Substance and Sexual Activity   • Alcohol use: Yes     Comment: occasionally, rarely   • Drug use: No   • Sexual activity: None   Other Topics Concern   • None   Social History Narrative   • None     Social Determinants of Health     Financial Resource Strain: Not on file   Food Insecurity: Not on file   Transportation Needs: Not on file   Physical Activity: Not on file   Stress: Not on file   Social Connections: Not on file   Intimate Partner Violence: Not on file   Housing Stability: Not on file     Social History     Tobacco Use   Smoking Status Former   • Packs/day: 2.00   • Types: Cigarettes   • Quit date: 2015   • Years since quittin.9   Smokeless Tobacco Current   Tobacco Comments    occ vape     Family History   Problem Relation Age of Onset   • Cancer Father    • Osteoporosis Mother        The following portions of the patient's history were reviewed and updated as appropriate allergies, current medications, past medical history, past social history, past surgical history and problem list    Imaging:    Results  No results found for this or any previous visit (from the past 1 hour(s)).]  Lab Results   Component Value Date    PSA 0.8 2019    PSA 0.6 2018     Lab Results   Component Value Date    CALCIUM 9.2 2019    K 4.2 2019    CO2 27 2019     2019    BUN 7 2019    CREATININE 1.19 2019     Lab Results   Component Value Date    WBC 8.67 2019 HGB 14.8 08/07/2019    HCT 44.1 08/07/2019    MCV 92 08/07/2019     08/07/2019       Please Note:  Voice dictation software has been used to create this document. There may be inadvertent transcriptions errors.      HALEY Yung 07/13/23

## 2023-09-19 ENCOUNTER — PROCEDURE VISIT (OUTPATIENT)
Dept: UROLOGY | Facility: CLINIC | Age: 63
End: 2023-09-19
Payer: MEDICARE

## 2023-09-19 VITALS
BODY MASS INDEX: 32.59 KG/M2 | WEIGHT: 247 LBS | DIASTOLIC BLOOD PRESSURE: 70 MMHG | SYSTOLIC BLOOD PRESSURE: 134 MMHG | HEART RATE: 70 BPM

## 2023-09-19 DIAGNOSIS — C67.2 MALIGNANT NEOPLASM OF LATERAL WALL OF URINARY BLADDER (HCC): ICD-10-CM

## 2023-09-19 DIAGNOSIS — Z12.5 ENCOUNTER FOR PROSTATE CANCER SCREENING: Primary | ICD-10-CM

## 2023-09-19 PROCEDURE — 99213 OFFICE O/P EST LOW 20 MIN: CPT | Performed by: UROLOGY

## 2023-09-19 PROCEDURE — 52000 CYSTOURETHROSCOPY: CPT | Performed by: UROLOGY

## 2023-09-19 RX ORDER — CIPROFLOXACIN 500 MG/1
500 TABLET, FILM COATED ORAL ONCE
Qty: 1 TABLET | Refills: 0 | Status: SHIPPED | OUTPATIENT
Start: 2023-09-19 | End: 2023-09-19

## 2023-09-19 NOTE — LETTER
2023     [address] [place]  [address] [facility]    Patient: Tammi Casey   YOB: 1960   Date of Visit: 2023       Dear Dr. Manolo aKng:    Thank you for referring Tammi Casey to me for evaluation. Below are my notes for this consultation. If you have questions, please do not hesitate to call me. I look forward to following your patient along with you. Sincerely,        Kirby Veliz MD        CC: No Recipients    Kirby Veliz MD  2023  9:59 AM  Sign when Signing Visit  [address] [facility]  [address] [facility], [address] [place] [address]  [phone]  [email or web address]      NAME: Tammi Casey  AGE: 70 y.o. SEX: male  : 1960   MRN: [id number]    DATE: 2023  TIME: 9:51 AM    Assessment and Plan:    Bladder cancer as below: No recurrence. Follow-up 1 year for cystoscopy. Hopefully this will be his final 1. Doing well from my perspective. Prostate cancer screening: Follow-up 1 year with a PSA. Chief Complaint   No chief complaint on file. History of Present Illness   Bladder CA- status post TURBT of small superficial recurrences left posterior wall and right dome with normal retrogrades of mitomycin installation 2019. This was his first recurrence, and he was first diagnosed with a 5.6 cm tumor . The last pathology showed low-grade papillary urothelial carcinoma in both spots, 1 small focus of detrusor muscle seen showing no malignancy. The only BCG he has received was with the original tumor in . Here for surveillance cystoscopy. No new complaints. Last cystoscopy was 2022 and this was negative for recurrence. Here for surveillance cystoscopy. Prostate cancer screening: PSA normal and stable at 0.96 2023.        Cystoscopy     Date/Time 2023 9:30 AM     Performed by  Kirby Veliz MD   Authorized by [place] HALEY Ortiz     Universal Protocol:  Consent: Verbal consent obtained. Written consent obtained. Procedure Details:  Procedure type: cystoscopy    Additional Procedure Details: Cystoscopy Procedure Note        Pre-operative Diagnosis: Bladder cancer surveillance    Post-operative Diagnosis: Same, no recurrence    Procedure: Flexible cystoscopy    Surgeon: Ivan Murcia MD    Anesthesia: 1% Xylocaine per urethra    EBL: Minimal    Complications: none    Procedure Details   The risks, benefits, complications, treatment options, and expected outcomes were discussed with the patient. The patient concurred with the proposed plan, giving informed consent. Cystoscopy was performed today under local anesthesia, using sterile technique. The patient was placed in the supine position, prepped with Betadine, and draped in the usual sterile fashion. The flexible cystocope was used to inspect both the urethra and bladder    Findings:  Urethra: Normal without stricture. Prostate moderately occlusive. Mild intravesical intrusion. Bladder:  Smooth, not trabeculated and there were no stones tumors or other lesions. The orifices were orthotopic and intact.   No recurrence           Specimens: none                 Complications:  None           Disposition: To home            Condition:  Stable          The following portions of the patient's history were reviewed and updated as appropriate: allergies, current medications, past family history, past medical history, past social history, past surgical history and problem list.  Past Medical History:   Diagnosis Date   • Arthritis    • Back pain    • Cancer of lateral wall of urinary bladder ([address])    • Cataract, right eye    • Edema    • GERD (gastroesophageal reflux disease)    • Hematuria, gross    • Herniated lumbar intervertebral disc    • Hypertension    • Macular degeneration      Past Surgical History:   Procedure Laterality Date   • BLADDER INSTILLATION  2015    BCG X 6   • CYSTOSCOPY  09/30/2015    W/ Irrigation of clots   • FL RETROGRADE PYELOGRAM  8/7/2019   • [place] BLADDER INSTILLATION ANTICARCINOGENIC AGENT N/A 8/7/2019    Procedure: INSTILLATION MITOMYCIN;  Surgeon: Jose Astorga MD;  [facility];  Service: Urology   • [place] CYSTO BLADDER W/URETERAL CATHETERIZATION Bilateral 8/7/2019    Procedure: CYSTOSCOPY WITH RETROGRADE PYELOGRAM;  Surgeon: Jose Astorga MD;  [facility];  Service: Urology   • WY CYSTO W/REMOVAL OF LESIONS SMALL N/A 8/7/2019    Procedure: TRANSURETHRAL RESECTION OF BLADDER TUMOR (TURBT); Surgeon: Jose Astorga MD;  [facility];  Service: Urology   • TRANSURETHRAL RESECTION OF BLADDER TUMOR  09/2015     shoulder  Review of Systems   Review of Systems   Genitourinary: Negative. Active Problem List     Patient Active Problem List   Diagnosis   • Malignant neoplasm of posterior wall of urinary bladder (HCC)       Objective   /70   Pulse 70   Wt 112 kg (247 lb)   BMI 32.59 kg/m²     Physical Exam  Vitals reviewed. Constitutional:       Appearance: Normal appearance. HENT:      Head: Normocephalic and atraumatic. Eyes:      Extraocular Movements: Extraocular movements intact. Pulmonary:      Effort: Pulmonary effort is normal.   Musculoskeletal:         General: Normal range of motion. Cervical back: Normal range of motion. Skin:     Coloration: Skin is not jaundiced or pale. Neurological:      General: No focal deficit present. Mental Status: He is alert and oriented to person, place, and time. Mental status is at baseline. Psychiatric:         Mood and Affect: Mood normal.         Behavior: Behavior normal.         Thought Content:  Thought content normal.         Judgment: Judgment normal.             Current Medications     Current Outpatient Medications:   •  amLODIPine (NORVASC) 10 mg tablet, Take 1 tablet by mouth, Disp: , Rfl:   •  Multiple Vitamins-Minerals (EYE VITAMINS PO), Take 1 tablet by mouth daily, Disp: , Rfl:   •  Azelastine HCl 137 MCG/SPRAY SOLN, , Disp: , Rfl:   •  lisinopril (ZESTRIL) 10 mg tablet, Take 10 mg by mouth daily (Patient not taking: Reported on 7/13/2023), Disp: , Rfl:   •  omeprazole (PriLOSEC OTC) 20 MG tablet, Take 20 mg by mouth as needed  (Patient not taking: Reported on 7/27/2022), Disp: , Rfl:         Aaron Naylor MD

## 2023-09-19 NOTE — PROGRESS NOTES
[address] [facility]  [facility]  [address] [place] [address]  [phone]  [email or web address]      NAME: Serge Kline  AGE: 70 y.o. SEX: male  : 1960   MRN: [id number]    DATE: 2023  TIME: 9:51 AM    Assessment and Plan:    Bladder cancer as below: No recurrence. Follow-up 1 year for cystoscopy. Hopefully this will be his final 1. Doing well from my perspective. Prostate cancer screening: Follow-up 1 year with a PSA. Chief Complaint   No chief complaint on file. History of Present Illness   Bladder CA- status post TURBT of small superficial recurrences left posterior wall and right dome with normal retrogrades of mitomycin installation 2019.  This was his first recurrence, and he was first diagnosed with a 5.6 cm tumor . The last pathology showed low-grade papillary urothelial carcinoma in both spots, 1 small focus of detrusor muscle seen showing no malignancy.  The only BCG he has received was with the original tumor in . [facility] for surveillance cystoscopy.  No new complaints. Last cystoscopy was 2022 and this was negative for recurrence. Here for surveillance cystoscopy. Prostate cancer screening: PSA normal and stable at 0.96 2023. Cystoscopy     Date/Time 2023 9:30 AM     Performed by  Keyshawn Bernal MD   Authorized by HALEY Acevedo     Universal Protocol:  Consent: Verbal consent obtained. Written consent obtained.       Procedure Details:  Procedure type: cystoscopy    Additional Procedure Details: Cystoscopy Procedure Note        Pre-operative Diagnosis: Bladder cancer surveillance    Post-operative Diagnosis: Same, no recurrence    Procedure: Flexible cystoscopy    Surgeon: Jero Srinivasan MD    Anesthesia: 1% Xylocaine per urethra    EBL: Minimal    Complications: none    Procedure Details   The risks, benefits, complications, treatment options, and expected outcomes were discussed with the patient. The patient concurred with the proposed plan, giving informed consent. Cystoscopy was performed today under local anesthesia, using sterile technique. The patient was placed in the supine position, prepped with Betadine, and draped in the usual sterile fashion. The flexible cystocope was used to inspect both the urethra and bladder    Findings:  Urethra: Normal without stricture. Prostate moderately occlusive. Mild intravesical intrusion. Bladder:  Smooth, not trabeculated and there were no stones tumors or other lesions. The orifices were orthotopic and intact.   No recurrence           Specimens: none                 Complications:  None           Disposition: To home            Condition:  Stable          The following portions of the patient's history were reviewed and updated as appropriate: allergies, current medications, past family history, past medical history, past social history, past surgical history and problem list.  Past Medical History:   Diagnosis Date   • Arthritis    • Back pain    • Cancer of lateral wall of urinary bladder ([address])    • Cataract, right eye    • Edema    • GERD (gastroesophageal reflux disease)    • Hematuria, gross    • Herniated lumbar intervertebral disc    • Hypertension    • Macular degeneration      Past Surgical History:   Procedure Laterality Date   • BLADDER INSTILLATION  2015    BCG  X 6   • CYSTOSCOPY  09/30/2015    W/ Irrigation of clots   • FL RETROGRADE PYELOGRAM  8/7/2019   • MO BLADDER INSTILLATION ANTICARCINOGENIC AGENT N/A 8/7/2019    Procedure: INSTILLATION MITOMYCIN;  Surgeon: Inge Schneider MD;  [facility];  Service: Urology   • MO CYSTO BLADDER W/URETERAL CATHETERIZATION Bilateral 8/7/2019    Procedure: CYSTOSCOPY WITH RETROGRADE PYELOGRAM;  Surgeon: Inge Schneider MD;  [facility];  Service: Urology   • MO CYSTO W/REMOVAL OF LESIONS SMALL N/A 8/7/2019    Procedure: TRANSURETHRAL RESECTION OF BLADDER TUMOR (TURBT); Surgeon: Aaron Naylor MD;  [facility];  Service: Urology   • TRANSURETHRAL RESECTION OF BLADDER TUMOR  09/2015     shoulder  Review of Systems   Review of Systems   Genitourinary: Negative. Active Problem List     Patient Active Problem List   Diagnosis   • Malignant neoplasm of posterior wall of urinary bladder (HCC)       Objective   /70   Pulse 70   Wt 112 kg (247 lb)   BMI 32.59 kg/m²     Physical Exam  Vitals reviewed. Constitutional:       Appearance: Normal appearance. HENT:      Head: Normocephalic and atraumatic. Eyes:      Extraocular Movements: Extraocular movements intact. Pulmonary:      Effort: Pulmonary effort is normal.   Musculoskeletal:         General: Normal range of motion. Cervical back: Normal range of motion. Skin:     Coloration: Skin is not jaundiced or pale. Neurological:      General: No focal deficit present. Mental Status: He is alert and oriented to person, place, and time. Mental status is at baseline. Psychiatric:         Mood and Affect: Mood normal.         Behavior: Behavior normal.         Thought Content:  Thought content normal.         Judgment: Judgment normal.             Current Medications     Current Outpatient Medications:   •  amLODIPine (NORVASC) 10 mg tablet, Take 1 tablet by mouth, Disp: , Rfl:   •  Multiple Vitamins-Minerals (EYE VITAMINS PO), Take 1 tablet by mouth daily, Disp: , Rfl:   •  Azelastine HCl 137 MCG/SPRAY SOLN, , Disp: , Rfl:   •  lisinopril (ZESTRIL) 10 mg tablet, Take 10 mg by mouth daily (Patient not taking: Reported on 7/13/2023), Disp: , Rfl:   •  omeprazole (PriLOSEC OTC) 20 MG tablet, Take 20 mg by mouth as needed  (Patient not taking: Reported on 7/27/2022), Disp: , Rfl:         Aaron Naylor MD

## 2024-09-10 LAB — PSA SERPL-MCNC: 1.24 NG/ML

## 2024-09-17 ENCOUNTER — PROCEDURE VISIT (OUTPATIENT)
Dept: UROLOGY | Facility: CLINIC | Age: 64
End: 2024-09-17
Payer: MEDICARE

## 2024-09-17 VITALS
HEART RATE: 70 BPM | OXYGEN SATURATION: 96 % | BODY MASS INDEX: 31.57 KG/M2 | TEMPERATURE: 97.7 F | WEIGHT: 246 LBS | SYSTOLIC BLOOD PRESSURE: 130 MMHG | HEIGHT: 74 IN | DIASTOLIC BLOOD PRESSURE: 80 MMHG

## 2024-09-17 DIAGNOSIS — C67.2 MALIGNANT NEOPLASM OF LATERAL WALL OF URINARY BLADDER (HCC): Primary | ICD-10-CM

## 2024-09-17 DIAGNOSIS — Z12.5 ENCOUNTER FOR PROSTATE CANCER SCREENING: ICD-10-CM

## 2024-09-17 PROCEDURE — 52000 CYSTOURETHROSCOPY: CPT | Performed by: UROLOGY

## 2024-09-17 PROCEDURE — 99213 OFFICE O/P EST LOW 20 MIN: CPT | Performed by: UROLOGY

## 2024-09-17 RX ORDER — CIPROFLOXACIN 500 MG/1
500 TABLET, FILM COATED ORAL ONCE
Qty: 1 TABLET | Refills: 0 | Status: SHIPPED | OUTPATIENT
Start: 2024-09-17 | End: 2024-09-17

## 2024-09-17 NOTE — PROGRESS NOTES
UROLOGY PROGRESS NOTE   [facility]  [address]  [address]  [place] [address]  [phone]  Fax:[phone]  [email or web address]      NAME: Daniel Li  AGE: 64 y.o. SEX: male  : 1960   MRN: [id number]    DATE: 2024  TIME: 9:34 AM    Assessment and Plan:    Bladder cancer, 5 years out-no recurrence.  Stop routine surveillance cystoscopy.  Check kidney and bladder ultrasound with a PVR and I will send the results because he has not had any recent upper tract imaging.  Follow-up 1 year with urinalysis.    Prostate cancer screening:    PSA still low, repeat PSA 1 year.               Chief Complaint   No chief complaint on file.      History of Present Illness   Bladder CA- status post TURBT of small superficial recurrences left posterior wall and right dome with normal retrogrades of mitomycin installation 2019.  This was his first recurrence, and he was first diagnosed with a 5.6 cm tumor . The last pathology showed low-grade papillary urothelial carcinoma in both spots, 1 small focus of detrusor muscle seen showing no malignancy.  The only BCG he has received was with the original tumor in .  Here for surveillance cystoscopy.  No new complaints.  Last cystoscopy was 2023 and this was negative for recurrence.  Here for surveillance cystoscopy.     Prostate cancer screening:        Component  Ref Range & Units 9/10/24  8:19 AM 19 11:14 AM 18  9:36 AM   Prostate Specific Antigen Total  <4.00 ng/mL 1.24 0.8 R, CM 0.6 R, CM        Cystoscopy     Date/Time  2024 9:30 AM     Performed by  Buck Mistry MD   Authorized by  Buck Mistry MD     Universal Protocol:  Consent: Verbal consent obtained. Written consent obtained.      Procedure Details:  Procedure type: cystoscopy    Additional Procedure Details: Cystoscopy Procedure Note        Pre-operative Diagnosis: Bladder cancer surveillance    Post-operative Diagnosis: Same, no  recurrence    Procedure: Flexible cystoscopy    Surgeon: Buck Mistry MD    Anesthesia: 1% Xylocaine per urethra    EBL: Minimal    Complications: none    Procedure Details   The risks, benefits, complications, treatment options, and expected outcomes were discussed with the patient. The patient concurred with the proposed plan, giving informed consent.    Cystoscopy was performed today under local anesthesia, using sterile technique. The patient was placed in the supine position, prepped with Betadine, and draped in the usual sterile fashion. The flexible cystocope was used to inspect both the urethra and bladder    Findings:  Urethra: Normal/without stricture, moderate prostate occlusion by lobar with intravesical intrusion.    Bladder:  Smooth, not trabeculated and there were no stones tumors or other lesions.  The orifices were orthotopic and intact.  No recurrence.           Specimens: none                 Complications:  None           Disposition: To home            Condition:  Stable              The following portions of the patient's history were reviewed and updated as appropriate: allergies, current medications, past family history, past medical history, past social history, past surgical history and problem list.  Past Medical History:   Diagnosis Date    Arthritis     Back pain     Cancer of lateral wall of urinary bladder (HCC)     Cataract, right eye     Edema     GERD (gastroesophageal reflux disease)     Hematuria, gross     Herniated lumbar intervertebral disc     Hypertension     Macular degeneration      Past Surgical History:   Procedure Laterality Date    BLADDER INSTILLATION  2015    BCG  X 6    CYSTOSCOPY  09/30/2015    W/ Irrigation of clots    FL RETROGRADE PYELOGRAM  8/7/2019    [place] BLADDER INSTILLATION ANTICARCINOGENIC AGENT N/A 8/7/2019    Procedure: INSTILLATION MITOMYCIN;  Surgeon: Buck Mistry MD;  [facility];  Service: Urology    NV CYSTO BLADDER W/URETERAL CATHETERIZATION  "Bilateral 8/7/2019    Procedure: CYSTOSCOPY WITH RETROGRADE PYELOGRAM;  Surgeon: Buck Mistry MD;  [facility];  Service: Urology    [place] CYSTO W/REMOVAL OF LESIONS SMALL N/A 8/7/2019    Procedure: TRANSURETHRAL RESECTION OF BLADDER TUMOR (TURBT);  Surgeon: Buck Mistry MD;  [facility];  Service: Urology    TRANSURETHRAL RESECTION OF BLADDER TUMOR  09/2015     shoulder  Review of Systems   Review of Systems   Eyes:         Patient is legally blind   Genitourinary: Negative.    Musculoskeletal:  Positive for back pain.       Active Problem List     Patient Active Problem List   Diagnosis    Malignant neoplasm of posterior wall of urinary bladder (HCC)       Objective   /80   Pulse 70   Temp 97.7 °F (36.5 °C)   Ht 6' 2\" (1.88 m)   Wt 112 kg (246 lb)   SpO2 96%   BMI 31.58 kg/m²     Physical Exam  Vitals reviewed.   Constitutional:       Appearance: Normal appearance.   HENT:      Head: Normocephalic and atraumatic.   Eyes:      Comments: Patient is legally blind   Pulmonary:      Effort: Pulmonary effort is normal.   Genitourinary:     Penis: Normal.       Prostate: Normal.      Rectum: Normal.      Comments: LUPE reveals normal tone no masses and his prostate is about 40 to 50 g, smooth symmetric and benign.  No nodules.  Musculoskeletal:         General: No swelling or tenderness. Normal range of motion.      Cervical back: Normal range of motion.   Skin:     Coloration: Skin is not jaundiced or pale.   Neurological:      General: No focal deficit present.      Mental Status: He is alert and oriented to person, place, and time. Mental status is at baseline.   Psychiatric:         Mood and Affect: Mood normal.         Behavior: Behavior normal.         Thought Content: Thought content normal.         Judgment: Judgment normal.             Current Medications     Current Outpatient Medications:     amLODIPine (NORVASC) 10 mg tablet, Take 1 tablet by mouth, Disp: , Rfl:     ciprofloxacin " (Cipro) 500 mg tablet, Take 1 tablet (500 mg total) by mouth once for 1 dose Take 1 dose after  procedure, Disp: 1 tablet, Rfl: 0    Multiple Vitamins-Minerals (EYE VITAMINS PO), Take 1 tablet by mouth daily, Disp: , Rfl:     Azelastine HCl 137 MCG/SPRAY SOLN, , Disp: , Rfl:     lisinopril (ZESTRIL) 10 mg tablet, Take 10 mg by mouth daily (Patient not taking: Reported on 7/13/2023), Disp: , Rfl:     omeprazole (PriLOSEC OTC) 20 MG tablet, Take 20 mg by mouth as needed  (Patient not taking: Reported on 7/27/2022), Disp: , Rfl:         Buck Mistry MD

## 2024-09-19 ENCOUNTER — HOSPITAL ENCOUNTER (OUTPATIENT)
Dept: ULTRASOUND IMAGING | Facility: HOSPITAL | Age: 64
Discharge: HOME | End: 2024-09-19
Attending: UROLOGY
Payer: MEDICARE

## 2024-09-19 DIAGNOSIS — C67.2 MALIGNANT NEOPLASM OF LATERAL WALL OF URINARY BLADDER (HCC): ICD-10-CM

## 2024-09-19 PROCEDURE — 76770 US EXAM ABDO BACK WALL COMP: CPT

## 2024-09-23 ENCOUNTER — TELEPHONE (OUTPATIENT)
Dept: UROLOGY | Facility: CLINIC | Age: 64
End: 2024-09-23

## 2024-09-23 NOTE — TELEPHONE ENCOUNTER
----- Message from Buck Mistry MD sent at 9/23/2024  8:11 AM EDT -----  Please let him know that his ultrasound looks good.  He empties his bladder well, nothing suspicious.

## 2025-07-16 ENCOUNTER — TELEPHONE (OUTPATIENT)
Age: 65
End: 2025-07-16

## 2025-07-16 NOTE — TELEPHONE ENCOUNTER
Pt under care of:  Dr. Mistry  Office Location: [place]    Insurance   Current Insurance?yes   Insurance E-verified? yes      History  Last Seen: 9/17/2024    Wife calling due to: Schedule yearly appt     Active Symptoms? no Explain: n/a    Appointment Details     Date:  9/17/2025  Time:  11 am  Location:  [place]  Provider:  Kevon TOTH    Does the appointment need further review? N/A Wife of the patient opted to schedule with the AP in the office since there was no appt available with the provider      Wife, Katie, can be reached at: [phone]

## (undated) DEVICE — UROCATCH BAG

## (undated) DEVICE — BAG URINE DRAINAGE 2000ML ANTI RFLX LF

## (undated) DEVICE — SCD SEQUENTIAL COMPRESSION COMFORT SLEEVE MEDIUM KNEE LENGTH: Brand: KENDALL SCD

## (undated) DEVICE — CATH FOLEY 24FR 5ML 2 WAY SILICONE ELASTIMER

## (undated) DEVICE — BAG DECANTER

## (undated) DEVICE — SPECIMEN CONTAINER STERILE PEEL PACK

## (undated) DEVICE — BASIC SINGLE BASIN-LF: Brand: MEDLINE INDUSTRIES, INC.

## (undated) DEVICE — CHEMOTHERAPY CONTAINER,HINGED LID, YELLOW: Brand: SHARPSAFETY

## (undated) DEVICE — PACK TUR

## (undated) DEVICE — NEEDLE 18 G X 1 1/2

## (undated) DEVICE — 3M™ STERI-STRIP™ COMPOUND BENZOIN TINCTURE 40 BAGS/CARTON 4 CARTONS/CASE C1544: Brand: 3M™ STERI-STRIP™

## (undated) DEVICE — GUIDEWIRE STRGHT TIP 0.035 IN  SOLO PLUS

## (undated) DEVICE — TUBING SUCTION 5MM X 12 FT

## (undated) DEVICE — CONNECTOR PH 6-IN-1 Y ST: Brand: CARDINAL HEALTH

## (undated) DEVICE — CATH FOLEY 18FR 5ML 2 WAY SILICONE ELASTIMER

## (undated) DEVICE — EXIDINE 4 PCT

## (undated) DEVICE — CYSTO TUBING TUR Y IRRIGATION

## (undated) DEVICE — TRAY FOLEY 16FR URIMETER SURESTEP

## (undated) DEVICE — 3000CC GUARDIAN II: Brand: GUARDIAN

## (undated) DEVICE — CATH URETERAL 5FR X 70 CM FLEX TIP POLYUR BARD

## (undated) DEVICE — GLOVE SRG BIOGEL 7

## (undated) DEVICE — TELFA NON-ADHERENT ABSORBENT DRESSING: Brand: TELFA

## (undated) DEVICE — EVACUATOR BLADDER ELLIK DISP STRL

## (undated) DEVICE — PREMIUM DRY TRAY LF: Brand: MEDLINE INDUSTRIES, INC.

## (undated) DEVICE — LUBRICANT SURGILUBE TUBE 4 OZ  FLIP TOP